# Patient Record
Sex: FEMALE | Race: BLACK OR AFRICAN AMERICAN | NOT HISPANIC OR LATINO | Employment: FULL TIME | URBAN - METROPOLITAN AREA
[De-identification: names, ages, dates, MRNs, and addresses within clinical notes are randomized per-mention and may not be internally consistent; named-entity substitution may affect disease eponyms.]

---

## 2023-08-21 LAB
COBALAMIN (VITAMIN B12) (PG/ML) IN SER/PLAS: 671 PG/ML (ref 211–911)
ERYTHROCYTE DISTRIBUTION WIDTH (RATIO) BY AUTOMATED COUNT: 18.4 % (ref 11.5–14.5)
ERYTHROCYTE MEAN CORPUSCULAR HEMOGLOBIN CONCENTRATION (G/DL) BY AUTOMATED: 28 G/DL (ref 32–36)
ERYTHROCYTE MEAN CORPUSCULAR VOLUME (FL) BY AUTOMATED COUNT: 73 FL (ref 80–100)
ERYTHROCYTES (10*6/UL) IN BLOOD BY AUTOMATED COUNT: 4.67 X10E12/L (ref 4–5.2)
ESTIMATED AVERAGE GLUCOSE FOR HBA1C: 111 MG/DL
FOLATE (NG/ML) IN SER/PLAS: 21.7 NG/ML
HEMATOCRIT (%) IN BLOOD BY AUTOMATED COUNT: 33.9 % (ref 36–46)
HEMOGLOBIN (G/DL) IN BLOOD: 9.5 G/DL (ref 12–16)
HEMOGLOBIN A1C/HEMOGLOBIN TOTAL IN BLOOD: 5.5 %
LEUKOCYTES (10*3/UL) IN BLOOD BY AUTOMATED COUNT: 5.7 X10E9/L (ref 4.4–11.3)
NRBC (PER 100 WBCS) BY AUTOMATED COUNT: 0 /100 WBC (ref 0–0)
PLATELETS (10*3/UL) IN BLOOD AUTOMATED COUNT: 414 X10E9/L (ref 150–450)
THYROTROPIN (MIU/L) IN SER/PLAS BY DETECTION LIMIT <= 0.05 MIU/L: 1.27 MIU/L (ref 0.44–3.98)

## 2023-08-22 LAB
FERRITIN (UG/LL) IN SER/PLAS: 11 UG/L (ref 8–150)
IRON (UG/DL) IN SER/PLAS: 20 UG/DL (ref 35–150)
IRON BINDING CAPACITY (UG/DL) IN SER/PLAS: >470 UG/DL (ref 240–445)
IRON SATURATION (%) IN SER/PLAS: ABNORMAL % (ref 25–45)

## 2023-10-11 ENCOUNTER — APPOINTMENT (OUTPATIENT)
Dept: PRIMARY CARE | Facility: CLINIC | Age: 33
End: 2023-10-11
Payer: COMMERCIAL

## 2023-10-21 PROBLEM — M25.562 BILATERAL KNEE PAIN: Status: ACTIVE | Noted: 2023-10-21

## 2023-10-21 PROBLEM — S09.93XS: Status: ACTIVE | Noted: 2023-10-21

## 2023-10-21 PROBLEM — J06.9 ACUTE URI: Status: ACTIVE | Noted: 2023-10-21

## 2023-10-21 PROBLEM — T39.95XA ANALGESIC REBOUND HEADACHE: Status: ACTIVE | Noted: 2023-10-21

## 2023-10-21 PROBLEM — R10.84 ABDOMINAL CRAMPING, GENERALIZED: Status: ACTIVE | Noted: 2023-10-21

## 2023-10-21 PROBLEM — N39.41 URGE INCONTINENCE: Status: ACTIVE | Noted: 2023-10-21

## 2023-10-21 PROBLEM — M76.61 ACHILLES TENDINITIS OF BOTH LOWER EXTREMITIES: Status: ACTIVE | Noted: 2023-10-21

## 2023-10-21 PROBLEM — M54.50 LOW BACK PAIN: Status: ACTIVE | Noted: 2023-10-21

## 2023-10-21 PROBLEM — E55.9 VITAMIN D DEFICIENCY: Status: ACTIVE | Noted: 2023-10-21

## 2023-10-21 PROBLEM — F41.9 ANXIETY: Status: ACTIVE | Noted: 2023-10-21

## 2023-10-21 PROBLEM — Z86.59 HISTORY OF BIPOLAR DISORDER: Status: ACTIVE | Noted: 2023-10-21

## 2023-10-21 PROBLEM — L72.0 INCLUSION CYST: Status: ACTIVE | Noted: 2023-10-21

## 2023-10-21 PROBLEM — M25.561 BILATERAL KNEE PAIN: Status: ACTIVE | Noted: 2023-10-21

## 2023-10-21 PROBLEM — D64.9 ANEMIA: Status: ACTIVE | Noted: 2023-10-21

## 2023-10-21 PROBLEM — M76.62 ACHILLES TENDINITIS OF BOTH LOWER EXTREMITIES: Status: ACTIVE | Noted: 2023-10-21

## 2023-10-21 PROBLEM — R73.03 PREDIABETES: Status: ACTIVE | Noted: 2023-10-21

## 2023-10-21 PROBLEM — M25.50 JOINT PAIN: Status: ACTIVE | Noted: 2023-10-21

## 2023-10-21 PROBLEM — E78.5 HYPERLIPIDEMIA: Status: ACTIVE | Noted: 2023-10-21

## 2023-10-21 PROBLEM — J06.9 ACUTE URI: Status: RESOLVED | Noted: 2023-10-21 | Resolved: 2023-10-21

## 2023-10-21 PROBLEM — T78.40XA ALLERGIES: Status: ACTIVE | Noted: 2023-10-21

## 2023-10-21 PROBLEM — R00.2 PALPITATIONS: Status: ACTIVE | Noted: 2023-10-21

## 2023-10-21 PROBLEM — N92.6 ABNORMAL SHORT MENSTRUAL CYCLE: Status: ACTIVE | Noted: 2023-10-21

## 2023-10-21 PROBLEM — R68.89 MULTIPLE COMPLAINTS: Status: ACTIVE | Noted: 2023-10-21

## 2023-10-21 PROBLEM — D50.9 IRON DEFICIENCY ANEMIA: Status: ACTIVE | Noted: 2023-10-21

## 2023-10-21 PROBLEM — K58.9 IRRITABLE BOWEL DISEASE: Status: ACTIVE | Noted: 2023-10-21

## 2023-10-21 PROBLEM — E66.9 OBESITY (BMI 35.0-39.9 WITHOUT COMORBIDITY): Status: ACTIVE | Noted: 2023-10-21

## 2023-10-21 PROBLEM — G44.40 ANALGESIC REBOUND HEADACHE: Status: ACTIVE | Noted: 2023-10-21

## 2023-10-21 PROBLEM — D64.9 ANEMIA: Status: RESOLVED | Noted: 2023-10-21 | Resolved: 2023-10-21

## 2023-10-21 PROBLEM — R07.9 CHEST PAIN: Status: ACTIVE | Noted: 2023-10-21

## 2023-10-21 RX ORDER — FERROUS SULFATE 325(65) MG
1 TABLET, DELAYED RELEASE (ENTERIC COATED) ORAL EVERY OTHER DAY
COMMUNITY
Start: 2022-03-30 | End: 2024-03-25 | Stop reason: WASHOUT

## 2023-10-21 RX ORDER — LIDOCAINE 560 MG/1
PATCH PERCUTANEOUS; TOPICAL; TRANSDERMAL
COMMUNITY
Start: 2021-01-05 | End: 2024-01-11

## 2023-10-21 RX ORDER — ALBUTEROL SULFATE 90 UG/1
AEROSOL, METERED RESPIRATORY (INHALATION)
COMMUNITY
Start: 2021-10-17 | End: 2024-01-18 | Stop reason: WASHOUT

## 2023-10-21 RX ORDER — ACETAMINOPHEN 325 MG/1
325 TABLET ORAL 4 TIMES DAILY PRN
COMMUNITY
Start: 2021-09-23

## 2023-10-21 RX ORDER — BUSPIRONE HYDROCHLORIDE 5 MG/1
5 TABLET ORAL EVERY 12 HOURS
COMMUNITY
End: 2024-01-18 | Stop reason: WASHOUT

## 2023-10-21 RX ORDER — ASPIRIN 325 MG
50000 TABLET, DELAYED RELEASE (ENTERIC COATED) ORAL
COMMUNITY
Start: 2021-10-22 | End: 2024-01-18 | Stop reason: WASHOUT

## 2023-10-21 RX ORDER — TIZANIDINE 2 MG/1
2 TABLET ORAL NIGHTLY PRN
COMMUNITY
Start: 2021-10-20 | End: 2024-01-18 | Stop reason: WASHOUT

## 2023-10-21 RX ORDER — ULIPRISTAL ACETATE 30 MG/1
TABLET ORAL
COMMUNITY
End: 2024-01-18 | Stop reason: WASHOUT

## 2023-10-21 RX ORDER — LACTIC ACID, L-, CITRIC ACID MONOHYDRATE, AND POTASSIUM BITARTRATE 90; 50; 20 MG/5G; MG/5G; MG/5G
GEL VAGINAL
COMMUNITY
End: 2024-01-18 | Stop reason: WASHOUT

## 2023-10-21 RX ORDER — HYDROXYZINE PAMOATE 25 MG/1
25 CAPSULE ORAL 3 TIMES DAILY PRN
COMMUNITY
End: 2024-01-18 | Stop reason: WASHOUT

## 2023-10-21 RX ORDER — IBUPROFEN 600 MG/1
600 TABLET ORAL EVERY 6 HOURS PRN
COMMUNITY
Start: 2020-12-30 | End: 2024-01-18 | Stop reason: WASHOUT

## 2023-10-21 RX ORDER — OMEPRAZOLE 20 MG/1
CAPSULE, DELAYED RELEASE ORAL
COMMUNITY
Start: 2021-10-17 | End: 2024-01-18 | Stop reason: WASHOUT

## 2023-10-21 RX ORDER — DOCUSATE SODIUM 100 MG/1
100 CAPSULE, LIQUID FILLED ORAL 2 TIMES DAILY PRN
COMMUNITY
Start: 2020-12-30 | End: 2024-01-18 | Stop reason: SDUPTHER

## 2023-10-21 RX ORDER — CYCLOBENZAPRINE HCL 5 MG
5 TABLET ORAL NIGHTLY PRN
COMMUNITY
Start: 2021-01-05 | End: 2024-03-25 | Stop reason: WASHOUT

## 2023-10-24 ENCOUNTER — OFFICE VISIT (OUTPATIENT)
Dept: PRIMARY CARE | Facility: CLINIC | Age: 33
End: 2023-10-24
Payer: COMMERCIAL

## 2023-10-24 VITALS
WEIGHT: 205 LBS | DIASTOLIC BLOOD PRESSURE: 79 MMHG | HEART RATE: 82 BPM | TEMPERATURE: 98.3 F | OXYGEN SATURATION: 99 % | SYSTOLIC BLOOD PRESSURE: 132 MMHG | HEIGHT: 67 IN | BODY MASS INDEX: 32.18 KG/M2 | RESPIRATION RATE: 16 BRPM

## 2023-10-24 DIAGNOSIS — R82.998 LEUKOCYTES IN URINE: ICD-10-CM

## 2023-10-24 DIAGNOSIS — D64.9 ANEMIA, UNSPECIFIED TYPE: Primary | ICD-10-CM

## 2023-10-24 DIAGNOSIS — M54.12 CERVICAL RADICULITIS: ICD-10-CM

## 2023-10-24 PROCEDURE — 99214 OFFICE O/P EST MOD 30 MIN: CPT | Performed by: NURSE PRACTITIONER

## 2023-10-24 PROCEDURE — 99204 OFFICE O/P NEW MOD 45 MIN: CPT | Performed by: NURSE PRACTITIONER

## 2023-10-24 RX ORDER — CYCLOBENZAPRINE HCL 5 MG
5 TABLET ORAL NIGHTLY PRN
Qty: 30 TABLET | Refills: 0 | Status: SHIPPED | OUTPATIENT
Start: 2023-10-24 | End: 2023-12-23

## 2023-10-24 RX ORDER — NAPROXEN 500 MG/1
500 TABLET ORAL 2 TIMES DAILY PRN
Qty: 60 TABLET | Refills: 0 | Status: SHIPPED | OUTPATIENT
Start: 2023-10-24 | End: 2024-01-18 | Stop reason: WASHOUT

## 2023-10-24 RX ORDER — DICLOFENAC SODIUM 10 MG/G
4 GEL TOPICAL 4 TIMES DAILY
Qty: 100 G | Refills: 1 | Status: SHIPPED | OUTPATIENT
Start: 2023-10-24 | End: 2024-01-18 | Stop reason: WASHOUT

## 2023-10-24 SDOH — ECONOMIC STABILITY: INCOME INSECURITY: IN THE LAST 12 MONTHS, WAS THERE A TIME WHEN YOU WERE NOT ABLE TO PAY THE MORTGAGE OR RENT ON TIME?: NO

## 2023-10-24 SDOH — ECONOMIC STABILITY: HOUSING INSECURITY
IN THE LAST 12 MONTHS, WAS THERE A TIME WHEN YOU DID NOT HAVE A STEADY PLACE TO SLEEP OR SLEPT IN A SHELTER (INCLUDING NOW)?: NO

## 2023-10-24 SDOH — ECONOMIC STABILITY: FOOD INSECURITY: WITHIN THE PAST 12 MONTHS, YOU WORRIED THAT YOUR FOOD WOULD RUN OUT BEFORE YOU GOT MONEY TO BUY MORE.: NEVER TRUE

## 2023-10-24 SDOH — ECONOMIC STABILITY: FOOD INSECURITY: WITHIN THE PAST 12 MONTHS, THE FOOD YOU BOUGHT JUST DIDN'T LAST AND YOU DIDN'T HAVE MONEY TO GET MORE.: NEVER TRUE

## 2023-10-24 SDOH — ECONOMIC STABILITY: GENERAL
WHICH OF THE FOLLOWING DO YOU KNOW HOW TO USE AND HAVE ACCESS TO EVERY DAY? (CHOOSE ALL THAT APPLY): SMARTPHONE WITH CELLULAR DATA PLAN;DESKTOP COMPUTER, LAPTOP COMPUTER, OR TABLET WITH BROADBAND INTERNET CONNECTION

## 2023-10-24 ASSESSMENT — COLUMBIA-SUICIDE SEVERITY RATING SCALE - C-SSRS
2. HAVE YOU ACTUALLY HAD ANY THOUGHTS OF KILLING YOURSELF?: NO
1. IN THE PAST MONTH, HAVE YOU WISHED YOU WERE DEAD OR WISHED YOU COULD GO TO SLEEP AND NOT WAKE UP?: NO
6. HAVE YOU EVER DONE ANYTHING, STARTED TO DO ANYTHING, OR PREPARED TO DO ANYTHING TO END YOUR LIFE?: NO

## 2023-10-24 ASSESSMENT — PATIENT HEALTH QUESTIONNAIRE - PHQ9
1. LITTLE INTEREST OR PLEASURE IN DOING THINGS: NOT AT ALL
2. FEELING DOWN, DEPRESSED OR HOPELESS: NOT AT ALL
SUM OF ALL RESPONSES TO PHQ9 QUESTIONS 1 AND 2: 0
1. LITTLE INTEREST OR PLEASURE IN DOING THINGS: NOT AT ALL
SUM OF ALL RESPONSES TO PHQ9 QUESTIONS 1 AND 2: 0
SUM OF ALL RESPONSES TO PHQ9 QUESTIONS 1 & 2: 0
1. LITTLE INTEREST OR PLEASURE IN DOING THINGS: NOT AT ALL
2. FEELING DOWN, DEPRESSED OR HOPELESS: NOT AT ALL
2. FEELING DOWN, DEPRESSED OR HOPELESS: NOT AT ALL

## 2023-10-24 ASSESSMENT — SOCIAL DETERMINANTS OF HEALTH (SDOH)
WITHIN THE LAST YEAR, HAVE YOU BEEN KICKED, HIT, SLAPPED, OR OTHERWISE PHYSICALLY HURT BY YOUR PARTNER OR EX-PARTNER?: NO
WITHIN THE LAST YEAR, HAVE YOU BEEN AFRAID OF YOUR PARTNER OR EX-PARTNER?: NO
WITHIN THE LAST YEAR, HAVE YOU BEEN HUMILIATED OR EMOTIONALLY ABUSED IN OTHER WAYS BY YOUR PARTNER OR EX-PARTNER?: NO
WITHIN THE LAST YEAR, HAVE TO BEEN RAPED OR FORCED TO HAVE ANY KIND OF SEXUAL ACTIVITY BY YOUR PARTNER OR EX-PARTNER?: NO
IN THE PAST 12 MONTHS, HAS THE ELECTRIC, GAS, OIL, OR WATER COMPANY THREATENED TO SHUT OFF SERVICE IN YOUR HOME?: NO

## 2023-10-24 ASSESSMENT — ENCOUNTER SYMPTOMS
OCCASIONAL FEELINGS OF UNSTEADINESS: 0
LOSS OF SENSATION IN FEET: 0
DEPRESSION: 0
ABDOMINAL PAIN: 1

## 2023-10-24 ASSESSMENT — LIFESTYLE VARIABLES
AUDIT-C TOTAL SCORE: 0
HOW MANY STANDARD DRINKS CONTAINING ALCOHOL DO YOU HAVE ON A TYPICAL DAY: PATIENT DOES NOT DRINK
HOW OFTEN DO YOU HAVE SIX OR MORE DRINKS ON ONE OCCASION: NEVER
SKIP TO QUESTIONS 9-10: 1
HOW OFTEN DO YOU HAVE A DRINK CONTAINING ALCOHOL: NEVER

## 2023-10-24 ASSESSMENT — PAIN SCALES - GENERAL: PAINLEVEL: 0-NO PAIN

## 2023-10-24 NOTE — PATIENT INSTRUCTIONS
Thank you for coming in for your visit today!    Please follow up in 6 weeks to evaluate your neck pain.    Call to schedule with physical therapy and chiropractic medicine  Consider a cervical pillow and/or acupressure mat  Take Naprosyn every 12 hours around the clock to decrease inflammation.  Take the muscle relaxer to decrease spasms. This medication may cause drowsiness. Please do not operate machinery while taking this medication.    STRETCH!   Use heat and apply muscle rub prior to stretching.   Apply ice after stretching and while resting to decrease inflammation.   Soak in 2-3 cups of Epsom salt in the bathtub.      Call 911 or go to the emergency room if you have pain in your chest, difficulty breathing, or other life threatening symptoms.

## 2023-10-24 NOTE — PROGRESS NOTES
"Subjective   Janna Lee is a 33 y.o. female who presents for Abdominal Pain.  Abdominal Pain        Ms. Lee is a 34 yo F here today for NPV and ED follow up   PMH: heart murmur, anxiety medication, anemia     Anxiety meds prescribed through  OB  Abnormal pap smear but normal for the last 3 years    Tingling in her L arm that has been ongoing for the last year. This is associated with headaches, blurred vision, chest pains on the L side and feeling dizzy. She reports the feeling of a needle poking her skin for about 30 mins to 1 hour until it resolves. She will put her arm over her head and practice slow breathing while the episodes occur. She was prescribed naproxen and muscle relaxers at the ED on 10/17. Reports that the muscle relaxers make her tired and she will wake without the pain.     Ongoing back pain since she had her 2 year old daughter. Completed PT thereafter.     She thought initially that this was related to anxiety but she feels that this is a different sensation.   Medications: 2 anxiety medications that she takes as needed.     Allergies: lidocaine    Smoking: Denies  ETOH: Denies  Illicits: denies     Family history: DM, CHF, lupus   Father with stomach ulcers      All systems reviewed. Review of systems negative except for noted positives in HPI    Objective     /79 (BP Location: Left arm, Patient Position: Sitting, BP Cuff Size: Large adult)   Pulse 82   Temp 36.8 °C (98.3 °F) (Temporal)   Resp 16   Ht 1.702 m (5' 7\")   Wt 93 kg (205 lb)   LMP 10/20/2023 (Exact Date)   SpO2 99%   BMI 32.11 kg/m²    Vital signs noted and reviewed.       Physical Exam  Constitutional:       Appearance: Normal appearance.   Cardiovascular:      Rate and Rhythm: Normal rate and regular rhythm.   Pulmonary:      Effort: Pulmonary effort is normal. No respiratory distress.      Breath sounds: Normal breath sounds.   Musculoskeletal:      Cervical back: Swelling and tenderness present.   Skin:     " General: Skin is warm and dry.   Neurological:      Mental Status: She is oriented to person, place, and time.   Psychiatric:         Mood and Affect: Mood normal.             Assessment/Plan   Problem List Items Addressed This Visit    None  Visit Diagnoses       Anemia, unspecified type    -  Primary    Leukocytes in urine        Cervical radiculitis        Relevant Medications    cyclobenzaprine (Flexeril) 5 mg tablet    naproxen (Naprosyn) 500 mg tablet    diclofenac sodium (Voltaren) 1 % gel gel    Other Relevant Orders    Referral to Physical Therapy    Referral to [facility]

## 2024-01-11 ENCOUNTER — OFFICE VISIT (OUTPATIENT)
Dept: PRIMARY CARE | Facility: CLINIC | Age: 34
End: 2024-01-11
Payer: COMMERCIAL

## 2024-01-11 VITALS
WEIGHT: 213 LBS | SYSTOLIC BLOOD PRESSURE: 127 MMHG | BODY MASS INDEX: 33.43 KG/M2 | DIASTOLIC BLOOD PRESSURE: 76 MMHG | OXYGEN SATURATION: 100 % | HEART RATE: 75 BPM | RESPIRATION RATE: 16 BRPM | TEMPERATURE: 97.3 F | HEIGHT: 67 IN

## 2024-01-11 DIAGNOSIS — M54.2 NECK PAIN: ICD-10-CM

## 2024-01-11 DIAGNOSIS — O03.9 MISCARRIAGE (HHS-HCC): Primary | ICD-10-CM

## 2024-01-11 DIAGNOSIS — L02.91 ABSCESS: ICD-10-CM

## 2024-01-11 PROCEDURE — 99214 OFFICE O/P EST MOD 30 MIN: CPT | Performed by: NURSE PRACTITIONER

## 2024-01-11 PROCEDURE — 1036F TOBACCO NON-USER: CPT | Performed by: NURSE PRACTITIONER

## 2024-01-11 RX ORDER — BENZOCAINE AND MENTHOL 15; 2.6 MG/1; MG/1
1 LOZENGE ORAL
COMMUNITY
Start: 2021-07-10 | End: 2024-01-18 | Stop reason: WASHOUT

## 2024-01-11 RX ORDER — CEPHALEXIN 500 MG/1
500 CAPSULE ORAL 4 TIMES DAILY
COMMUNITY
Start: 2023-12-30 | End: 2024-01-18 | Stop reason: WASHOUT

## 2024-01-11 RX ORDER — FLUTICASONE PROPIONATE 50 MCG
1 SPRAY, SUSPENSION (ML) NASAL
COMMUNITY
Start: 2021-12-30 | End: 2024-01-18 | Stop reason: WASHOUT

## 2024-01-11 RX ORDER — CYCLOBENZAPRINE HCL 10 MG
10 TABLET ORAL NIGHTLY PRN
COMMUNITY
End: 2024-03-25 | Stop reason: WASHOUT

## 2024-01-11 RX ORDER — HYDROXYZINE HYDROCHLORIDE 25 MG/1
TABLET, FILM COATED ORAL
COMMUNITY
Start: 2023-08-21 | End: 2024-01-18 | Stop reason: WASHOUT

## 2024-01-11 ASSESSMENT — PAIN SCALES - GENERAL: PAINLEVEL: 3

## 2024-01-11 NOTE — PATIENT INSTRUCTIONS
Thank you for coming in for your visit today!    Please follow up in 8-12 weeks if preferred.     Focus on the basics!   Continue to listen to your body.  Restart iron supplementation.     Call to schedule with acupuncture     Keep upcoming OB appointments.    Refills will be sent through your pharmacy.    Call to schedule with general surgery for consultation of axillary abscess.    Call 911 or go to the emergency room if you have pain in your chest, difficulty breathing, or other life threatening symptoms.

## 2024-01-11 NOTE — PROGRESS NOTES
"Bonnie Lee is a 33 y.o. female who presents for No chief complaint on file..  HPI  Patient is here today for follow up   Recent miscarriage seen on 12/29 at 5 weeks gestation and was seen again in the emergency room on 1/2 with similar concern. Following with OB.     Today has concern for ongoing back, neck, and chest pain. Has been seeing the chiropractor and acupunturist for these concerns and found relief when she was having these treatments consistently.  Had been utilizing medications as listed. Has not followed up with physical therapy. Feels like she felt an increase in these symptoms immediately following the miscarriage. She feels as if her body is in a state of imbalance.     General surgery   Has had cyst lanced on abdomen once. Also notes history of frequent abscesses in the bilateral axilla. She would like to have a consultation to discuss removal today.      Chest xray normal from 12/29   Ddimer normal.     Currently on a detox, salads, smoothies. Trying to focus on her health.       All systems reviewed. Review of systems negative except for noted positives in HPI    Objective     /76   Pulse 75   Temp 36.3 °C (97.3 °F)   Resp 16   Ht 1.702 m (5' 7\")   Wt 96.6 kg (213 lb)   SpO2 100%   BMI 33.36 kg/m²    Vital signs noted and reviewed.       Physical Exam  Constitutional:       Appearance: Normal appearance.   Cardiovascular:      Rate and Rhythm: Normal rate and regular rhythm.   Pulmonary:      Effort: Pulmonary effort is normal. No respiratory distress.      Breath sounds: Normal breath sounds.   Skin:     General: Skin is warm and dry.   Neurological:      Mental Status: She is oriented to person, place, and time.   Psychiatric:         Mood and Affect: Mood normal.             Assessment/Plan   Problem List Items Addressed This Visit    None  Visit Diagnoses       Miscarriage    -  Primary    Relevant Orders    Referral to [facility]    Abscess        " Relevant Orders    Referral to General Surgery    Neck pain

## 2024-01-17 PROBLEM — R68.89 MULTIPLE COMPLAINTS: Status: RESOLVED | Noted: 2023-10-21 | Resolved: 2024-01-17

## 2024-01-17 PROBLEM — T78.40XA ALLERGIES: Status: RESOLVED | Noted: 2023-10-21 | Resolved: 2024-01-17

## 2024-01-17 NOTE — PROGRESS NOTES
Janna Lee is a 33 y.o. here for follow up s/p SAB at 5w. Patient was seen in ED on  and had HCG of 276, on  she represented to ED and had HCG of 70.     Patient denies any bleeding/pelvic pain currently.     GynHx:  Patient's last menstrual period was 2023.     Last pap: 23 WNL HPV neg  20 WNL HPV neg  3/18/19 ASCUS neg  4/29/15 LSIL  14 LSIL  13 LSIL  STI testing today: No     OB History          5    Para   4    Term   4            AB   1    Living   4         SAB   1    IAB        Ectopic        Multiple        Live Births   4                  Past Medical History:   Diagnosis Date    Hidradenitis suppurativa 2021    Hydradenitis    Hx of chlamydia infection     Hx of gonorrhea     Low grade squamous intraepithelial lesion on cytologic smear of cervix (LGSIL) 2019    LGSIL on Pap smear of cervix    Migraine     Personal history of other mental and behavioral disorders 2021    History of bipolar disorder       Past Surgical History:   Procedure Laterality Date    COLPOSCOPY  2014    Colposcopy       Objective   /85   Pulse 91   Wt 98.6 kg (217 lb 6.4 oz)   LMP 2023   BMI 34.05 kg/m²     Physical Exam  Constitutional:       Appearance: Normal appearance.   Pulmonary:      Effort: Pulmonary effort is normal.   Neurological:      Mental Status: She is alert.   Psychiatric:         Mood and Affect: Mood normal.         Behavior: Behavior normal.         Thought Content: Thought content normal.         Judgment: Judgment normal.         Problem List Items Addressed This Visit    None  Visit Diagnoses       SAB (spontaneous )    -  Primary    Relevant Medications    docusate sodium (Colace) 100 mg capsule    prenatal vitamin, iron-folic, 27 mg iron-800 mcg folic acid tablet    cholecalciferol (Vitamin D-3) 50,000 unit capsule    Other Relevant Orders    Human Chorionic Gonadotropin, Serum Quantitative                SWATI Marquez

## 2024-01-18 ENCOUNTER — OFFICE VISIT (OUTPATIENT)
Dept: OBSTETRICS AND GYNECOLOGY | Facility: CLINIC | Age: 34
End: 2024-01-18
Payer: COMMERCIAL

## 2024-01-18 ENCOUNTER — LAB (OUTPATIENT)
Dept: LAB | Facility: LAB | Age: 34
End: 2024-01-18
Payer: COMMERCIAL

## 2024-01-18 VITALS
BODY MASS INDEX: 34.05 KG/M2 | DIASTOLIC BLOOD PRESSURE: 85 MMHG | SYSTOLIC BLOOD PRESSURE: 120 MMHG | HEART RATE: 91 BPM | WEIGHT: 217.4 LBS

## 2024-01-18 DIAGNOSIS — O03.9 SAB (SPONTANEOUS ABORTION) (HHS-HCC): Primary | ICD-10-CM

## 2024-01-18 DIAGNOSIS — O03.9 SAB (SPONTANEOUS ABORTION) (HHS-HCC): ICD-10-CM

## 2024-01-18 PROBLEM — M25.562 BILATERAL KNEE PAIN: Status: RESOLVED | Noted: 2023-10-21 | Resolved: 2024-01-18

## 2024-01-18 PROBLEM — M54.50 LOW BACK PAIN: Status: RESOLVED | Noted: 2023-10-21 | Resolved: 2024-01-18

## 2024-01-18 PROBLEM — R87.619 ABNORMAL PAP SMEAR OF CERVIX: Status: ACTIVE | Noted: 2024-01-18

## 2024-01-18 PROBLEM — R10.84 ABDOMINAL CRAMPING, GENERALIZED: Status: RESOLVED | Noted: 2023-10-21 | Resolved: 2024-01-18

## 2024-01-18 PROBLEM — M25.50 JOINT PAIN: Status: RESOLVED | Noted: 2023-10-21 | Resolved: 2024-01-18

## 2024-01-18 PROBLEM — N92.6 ABNORMAL SHORT MENSTRUAL CYCLE: Status: RESOLVED | Noted: 2023-10-21 | Resolved: 2024-01-18

## 2024-01-18 PROBLEM — M25.561 BILATERAL KNEE PAIN: Status: RESOLVED | Noted: 2023-10-21 | Resolved: 2024-01-18

## 2024-01-18 PROBLEM — D50.9 IRON DEFICIENCY ANEMIA: Status: RESOLVED | Noted: 2023-10-21 | Resolved: 2024-01-18

## 2024-01-18 PROCEDURE — 99213 OFFICE O/P EST LOW 20 MIN: CPT | Performed by: ADVANCED PRACTICE MIDWIFE

## 2024-01-18 PROCEDURE — 36415 COLL VENOUS BLD VENIPUNCTURE: CPT

## 2024-01-18 PROCEDURE — 1036F TOBACCO NON-USER: CPT | Performed by: ADVANCED PRACTICE MIDWIFE

## 2024-01-18 PROCEDURE — 84702 CHORIONIC GONADOTROPIN TEST: CPT

## 2024-01-18 RX ORDER — ASPIRIN 325 MG
50000 TABLET, DELAYED RELEASE (ENTERIC COATED) ORAL
Qty: 30 CAPSULE | Refills: 0 | Status: SHIPPED | OUTPATIENT
Start: 2024-01-18 | End: 2024-03-25 | Stop reason: WASHOUT

## 2024-01-18 RX ORDER — DOCUSATE SODIUM 100 MG/1
100 CAPSULE, LIQUID FILLED ORAL NIGHTLY PRN
Qty: 60 CAPSULE | Refills: 1 | Status: SHIPPED | OUTPATIENT
Start: 2024-01-18 | End: 2024-03-25 | Stop reason: WASHOUT

## 2024-01-18 ASSESSMENT — PAIN SCALES - GENERAL: PAINLEVEL: 0-NO PAIN

## 2024-01-18 ASSESSMENT — ENCOUNTER SYMPTOMS
CARDIOVASCULAR NEGATIVE: 0
MUSCULOSKELETAL NEGATIVE: 0
PSYCHIATRIC NEGATIVE: 0
CONSTITUTIONAL NEGATIVE: 0
GASTROINTESTINAL NEGATIVE: 0
ENDOCRINE NEGATIVE: 0
HEMATOLOGIC/LYMPHATIC NEGATIVE: 0
ALLERGIC/IMMUNOLOGIC NEGATIVE: 0
NEUROLOGICAL NEGATIVE: 0
EYES NEGATIVE: 0
RESPIRATORY NEGATIVE: 0

## 2024-01-19 LAB — B-HCG SERPL-ACNC: <3 MIU/ML

## 2024-03-13 ENCOUNTER — APPOINTMENT (OUTPATIENT)
Dept: PRIMARY CARE | Facility: CLINIC | Age: 34
End: 2024-03-13
Payer: COMMERCIAL

## 2024-03-19 ENCOUNTER — APPOINTMENT (OUTPATIENT)
Dept: PRIMARY CARE | Facility: CLINIC | Age: 34
End: 2024-03-19
Payer: COMMERCIAL

## 2024-03-21 PROBLEM — S62.109A FRACTURE OF CARPAL BONE: Status: ACTIVE | Noted: 2024-03-21

## 2024-03-21 PROBLEM — E66.9 OBESITY WITH BODY MASS INDEX 30 OR GREATER: Status: ACTIVE | Noted: 2023-10-21

## 2024-03-21 PROBLEM — Z59.41 FOOD INSECURITY: Status: ACTIVE | Noted: 2024-03-21

## 2024-03-21 PROBLEM — N39.41 URGE INCONTINENCE OF URINE: Status: ACTIVE | Noted: 2023-08-21

## 2024-03-21 PROBLEM — Z86.69 HISTORY OF MIGRAINE: Status: ACTIVE | Noted: 2023-12-29

## 2024-03-21 PROBLEM — N93.9 ABNORMAL UTERINE BLEEDING: Status: ACTIVE | Noted: 2023-08-23

## 2024-03-21 PROBLEM — K58.9 IRRITABLE BOWEL SYNDROME: Status: ACTIVE | Noted: 2023-10-21

## 2024-03-21 PROBLEM — N94.6 DYSMENORRHEA: Status: ACTIVE | Noted: 2023-10-21

## 2024-03-21 PROBLEM — N39.0 URINARY TRACT INFECTION: Status: ACTIVE | Noted: 2024-03-21

## 2024-03-21 PROBLEM — Z86.19 HISTORY OF SEXUALLY TRANSMITTED DISEASE: Status: ACTIVE | Noted: 2023-12-29

## 2024-03-21 PROBLEM — A59.9 TRICHOMONIASIS: Status: ACTIVE | Noted: 2024-03-21
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
## 2024-03-21 PROBLEM — E66.9 CLASS 1 OBESITY: Status: ACTIVE | Noted: 2024-03-21

## 2024-03-21 PROBLEM — M43.6 TORTICOLLIS: Status: ACTIVE | Noted: 2023-10-21

## 2024-03-21 PROBLEM — M25.569 KNEE PAIN: Status: ACTIVE | Noted: 2023-10-21

## 2024-03-21 PROBLEM — E66.811 CLASS 1 OBESITY: Status: ACTIVE | Noted: 2024-03-21

## 2024-03-21 PROBLEM — U07.1 DISEASE DUE TO SEVERE ACUTE RESPIRATORY SYNDROME CORONAVIRUS 2 (SARS-COV-2): Status: ACTIVE | Noted: 2024-03-21

## 2024-03-21 PROBLEM — O36.80X0 PREGNANCY OF UNKNOWN ANATOMIC LOCATION (HHS-HCC): Status: ACTIVE | Noted: 2023-12-31

## 2024-03-21 PROBLEM — K62.5 RECTAL HEMORRHAGE: Status: ACTIVE | Noted: 2024-03-21

## 2024-03-21 PROBLEM — R68.89 MULTIPLE COMPLAINTS: Status: ACTIVE | Noted: 2023-10-21

## 2024-03-21 PROBLEM — M25.50 ARTHRALGIA: Status: ACTIVE | Noted: 2023-10-21

## 2024-03-21 PROBLEM — T83.32XA INTRAUTERINE CONTRACEPTIVE DEVICE THREADS LOST: Status: ACTIVE | Noted: 2024-03-21

## 2024-03-21 PROBLEM — D50.9 IRON DEFICIENCY ANEMIA: Status: ACTIVE | Noted: 2023-10-21

## 2024-03-21 PROBLEM — M76.60 ACHILLES TENDINITIS: Status: ACTIVE | Noted: 2024-03-21

## 2024-03-21 PROBLEM — R10.84 GENERALIZED ABDOMINAL PAIN: Status: ACTIVE | Noted: 2023-08-21

## 2024-03-21 PROBLEM — A74.9 INFECTION DUE TO CHLAMYDIA SPECIES: Status: ACTIVE | Noted: 2024-03-21

## 2024-03-21 PROBLEM — H53.8 BLURRING OF VISUAL IMAGE: Status: ACTIVE | Noted: 2024-03-21

## 2024-03-21 PROBLEM — F32.A DEPRESSIVE DISORDER: Status: ACTIVE | Noted: 2024-03-21

## 2024-03-21 PROBLEM — K02.9 DENTAL CARIES: Status: ACTIVE | Noted: 2018-03-07

## 2024-03-21 PROBLEM — R05.9 COUGH: Status: ACTIVE | Noted: 2024-03-21

## 2024-03-21 PROBLEM — L72.0 EPIDERMOID CYST: Status: ACTIVE | Noted: 2023-10-21

## 2024-03-21 PROBLEM — S09.93XA INJURY OF LIP: Status: ACTIVE | Noted: 2023-10-21

## 2024-03-21 PROBLEM — R07.89 SENSATION OF CHEST TIGHTNESS: Status: ACTIVE | Noted: 2023-08-23

## 2024-03-21 RX ORDER — OXYCODONE AND ACETAMINOPHEN 5; 325 MG/1; MG/1
TABLET ORAL EVERY 6 HOURS
COMMUNITY
Start: 2017-05-17 | End: 2024-03-25 | Stop reason: WASHOUT

## 2024-03-21 RX ORDER — NORGESTIMATE AND ETHINYL ESTRADIOL 0.25-0.035
KIT ORAL
COMMUNITY
Start: 2021-04-19 | End: 2024-03-25 | Stop reason: WASHOUT

## 2024-03-21 RX ORDER — PYRIDOXINE HCL (VITAMIN B6) 25 MG
TABLET ORAL
COMMUNITY
Start: 2020-05-19 | End: 2024-03-25 | Stop reason: WASHOUT

## 2024-03-21 RX ORDER — DEXAMETHASONE 2 MG/1
TABLET ORAL
COMMUNITY
Start: 2021-09-28 | End: 2024-03-25 | Stop reason: WASHOUT

## 2024-03-21 RX ORDER — CALCIUM CARBONATE/VITAMIN D3 500 MG-2.5
TABLET,CHEWABLE ORAL
COMMUNITY
Start: 2020-08-13 | End: 2024-03-25 | Stop reason: WASHOUT

## 2024-03-21 RX ORDER — PSYLLIUM HUSK 3.4 G/5.8G
POWDER ORAL
COMMUNITY
Start: 2021-05-17 | End: 2024-03-25 | Stop reason: WASHOUT

## 2024-03-21 RX ORDER — CLINDAMYCIN PHOSPHATE 1 G/10ML
GEL TOPICAL
COMMUNITY
Start: 2019-10-14 | End: 2024-03-25 | Stop reason: WASHOUT

## 2024-03-21 RX ORDER — DICYCLOMINE HYDROCHLORIDE 10 MG/1
CAPSULE ORAL
COMMUNITY
Start: 2022-11-08 | End: 2024-03-25 | Stop reason: WASHOUT

## 2024-03-21 RX ORDER — DIPHENHYDRAMINE HCL 25 MG
CAPSULE ORAL
COMMUNITY
End: 2024-03-25 | Stop reason: WASHOUT

## 2024-03-21 RX ORDER — AZITHROMYCIN 250 MG/1
TABLET, FILM COATED ORAL
COMMUNITY
Start: 2021-10-20 | End: 2024-03-25 | Stop reason: WASHOUT

## 2024-03-21 RX ORDER — NORELGESTROMIN AND ETHINYL ESTRADIOL 35; 150 UG/MG; UG/MG
PATCH TRANSDERMAL
COMMUNITY
Start: 2019-04-24 | End: 2024-03-25 | Stop reason: WASHOUT

## 2024-03-21 RX ORDER — MULTIVITAMIN
TABLET ORAL
COMMUNITY
End: 2024-03-25 | Stop reason: WASHOUT

## 2024-03-21 RX ORDER — ELECTROLYTES/DEXTROSE
14.2 SOLUTION, ORAL ORAL
COMMUNITY
Start: 2019-10-14 | End: 2024-03-25 | Stop reason: WASHOUT

## 2024-03-21 RX ORDER — AZELASTINE HYDROCHLORIDE 0.5 MG/ML
SOLUTION/ DROPS OPHTHALMIC EVERY 12 HOURS
COMMUNITY
Start: 2021-10-15 | End: 2024-03-25 | Stop reason: WASHOUT

## 2024-03-21 RX ORDER — PETROLATUM,WHITE 41 %
OINTMENT (GRAM) TOPICAL
COMMUNITY
Start: 2023-08-21 | End: 2024-03-25 | Stop reason: WASHOUT

## 2024-03-21 RX ORDER — SIMETHICONE 125 MG
CAPSULE ORAL
COMMUNITY
Start: 2021-05-17 | End: 2024-03-25 | Stop reason: WASHOUT

## 2024-03-25 ENCOUNTER — OFFICE VISIT (OUTPATIENT)
Dept: PRIMARY CARE | Facility: CLINIC | Age: 34
End: 2024-03-25
Payer: COMMERCIAL

## 2024-03-25 VITALS
OXYGEN SATURATION: 100 % | RESPIRATION RATE: 16 BRPM | TEMPERATURE: 97.1 F | HEART RATE: 75 BPM | SYSTOLIC BLOOD PRESSURE: 141 MMHG | HEIGHT: 67 IN | WEIGHT: 212 LBS | DIASTOLIC BLOOD PRESSURE: 92 MMHG | BODY MASS INDEX: 33.27 KG/M2

## 2024-03-25 DIAGNOSIS — H65.192 ACUTE EFFUSION OF LEFT EAR: ICD-10-CM

## 2024-03-25 DIAGNOSIS — M25.59 PAIN IN OTHER JOINT: ICD-10-CM

## 2024-03-25 DIAGNOSIS — N94.10 DYSPAREUNIA IN FEMALE: Primary | ICD-10-CM

## 2024-03-25 DIAGNOSIS — N39.41 URGE INCONTINENCE OF URINE: ICD-10-CM

## 2024-03-25 PROCEDURE — 1036F TOBACCO NON-USER: CPT | Performed by: NURSE PRACTITIONER

## 2024-03-25 PROCEDURE — 99215 OFFICE O/P EST HI 40 MIN: CPT | Performed by: NURSE PRACTITIONER

## 2024-03-25 RX ORDER — FLUTICASONE PROPIONATE 50 MCG
1 SPRAY, SUSPENSION (ML) NASAL DAILY
Qty: 16 G | Refills: 11 | Status: SHIPPED | OUTPATIENT
Start: 2024-03-25 | End: 2025-03-25

## 2024-03-25 RX ORDER — CETIRIZINE HYDROCHLORIDE 10 MG/1
10 TABLET ORAL DAILY
Qty: 90 TABLET | Refills: 2 | Status: SHIPPED | OUTPATIENT
Start: 2024-03-25

## 2024-03-25 ASSESSMENT — PATIENT HEALTH QUESTIONNAIRE - PHQ9
1. LITTLE INTEREST OR PLEASURE IN DOING THINGS: NOT AT ALL
SUM OF ALL RESPONSES TO PHQ9 QUESTIONS 1 AND 2: 0
2. FEELING DOWN, DEPRESSED OR HOPELESS: NOT AT ALL

## 2024-03-25 ASSESSMENT — ENCOUNTER SYMPTOMS
OCCASIONAL FEELINGS OF UNSTEADINESS: 0
LOSS OF SENSATION IN FEET: 0
DEPRESSION: 0

## 2024-03-25 ASSESSMENT — PAIN SCALES - GENERAL: PAINLEVEL: 0-NO PAIN

## 2024-03-25 NOTE — PATIENT INSTRUCTIONS
Thank you for coming in for your visit today!    Please follow up in 6 months or sooner if needed.    Use fluticasone nasal spray two sprays, twice daily.   When using this, position your head down, looking at your toes.   Aim the nozzle to the outside corner of the eye on the same side.  Take cetirizine nightly.     Call to schedule with Josse Moss  Call to schedule with pelvic floor therapy (physical therapy).    Call 911 or go to the emergency room if you have pain in your chest, difficulty breathing, or other life threatening symptoms.

## 2024-03-25 NOTE — PROGRESS NOTES
Subjective   Janna Lee is a 33 y.o. female who presents for Ear Drainage, Abdominal Cramping, and Migraine.  HPI  Ms. Lee is here today with concern for pain and ear ringing.     Was seen at [facility] ED on 3/10 and diagnosed with headache/ CP/ palpitations  She has history of chronic anemia.  Labs/ additional information from this encounter not available but patient notes that she was diagnosed with the flu.  Reviewed labs from ED visit on 2/16 with patient.     To note, patient experienced miscarriage at the end of December. At our last visit in January we discussed resumption of alternative therapies for pain relief, which were previously successful, in addition to physical therapy. She would like to get reconnected. Had acupuncture completed elsewhere and does not wish to continue.   Was lost to follow to follow up with OB until 1/18, at which time negative HCG is noted. Additional beta quant tested on 2/16 which was normal.   Currently menstruating, began yesterday. Notes anticipated symptoms including cramping and mild discomfort. This is her second cycle since the miscarriage.     Today reports that she had been experiencing headaches, on the L side. Notes some recent ear crackling, felt like it was draining.   Feels like it sounds like something is inside her ear, keeps popping.   She notes that she felt like her ear was emptying out water.   Notes some changes to her hearing.  After her ear pain started, she did end up coming down with the flu about 1 month ago.  She also had pressure on one side of her head with associated dizziness around the diagnosis of the flu.   She also had left sided blurriness and L ear ringing.   These symptoms have improved since resolution of the flu.  She continues to feel some pressure in the ear, with more frequent popping.   Denies taking medication for the pressure or discomfort. Denies fever or chills.     Recent pain with sex, which started after the miscarriage.  "  Notes pain both during and after intercourse.   Notes pelvic cramping.   Most recent PAP from 2023 WNL.  She notes that for the last several years she feels that sex is not as pleasurable as it once was. Also notes urge incontinence, now needing to wear a pad at nighttime for leaks.  Open to pelvic floor therapy and further OB consultation.       All systems reviewed. Review of systems negative except for noted positives in HPI    Objective     BP (!) 141/92   Pulse 75   Temp 36.2 °C (97.1 °F)   Resp 16   Ht 1.702 m (5' 7\")   Wt 96.2 kg (212 lb)   LMP 03/24/2024   SpO2 100%   BMI 33.20 kg/m²    Vital signs noted and reviewed.       Physical Exam  Constitutional:       Appearance: Normal appearance.   HENT:      Right Ear: A middle ear effusion is present.   Cardiovascular:      Rate and Rhythm: Normal rate and regular rhythm.   Pulmonary:      Effort: Pulmonary effort is normal. No respiratory distress.      Breath sounds: Normal breath sounds.   Skin:     General: Skin is warm and dry.   Neurological:      Mental Status: She is oriented to person, place, and time.   Psychiatric:         Mood and Affect: Mood normal.             Assessment/Plan   Problem List Items Addressed This Visit       Arthralgia    Relevant Orders    [facility]    [facility]    Urge incontinence of urine    Relevant Orders    Referral to Physical Therapy     Other Visit Diagnoses       Dyspareunia in female    -  Primary    Relevant Orders    Referral to Physical Therapy    Referral to Obstetrics / Gynecology    [facility]    [facility]    Acute effusion of left ear        Relevant Medications    fluticasone (Flonase) 50 mcg/actuation nasal spray    cetirizine (ZyrTEC) 10 mg tablet                    "

## 2024-07-05 ENCOUNTER — OFFICE VISIT (OUTPATIENT)
Dept: PRIMARY CARE | Facility: CLINIC | Age: 34
End: 2024-07-05
Payer: COMMERCIAL

## 2024-07-05 VITALS
HEART RATE: 88 BPM | DIASTOLIC BLOOD PRESSURE: 84 MMHG | SYSTOLIC BLOOD PRESSURE: 132 MMHG | RESPIRATION RATE: 18 BRPM | WEIGHT: 217 LBS | TEMPERATURE: 98.4 F | BODY MASS INDEX: 34.06 KG/M2 | HEIGHT: 67 IN

## 2024-07-05 DIAGNOSIS — T14.8XXA MUSCLE STRAIN: ICD-10-CM

## 2024-07-05 DIAGNOSIS — L90.5 SCAR OF LIP: ICD-10-CM

## 2024-07-05 DIAGNOSIS — G62.9 PERIPHERAL POLYNEUROPATHY: ICD-10-CM

## 2024-07-05 DIAGNOSIS — H92.02 LEFT EAR PAIN: ICD-10-CM

## 2024-07-05 DIAGNOSIS — G89.29 CHRONIC MIDLINE LOW BACK PAIN, UNSPECIFIED WHETHER SCIATICA PRESENT: Primary | ICD-10-CM

## 2024-07-05 DIAGNOSIS — R73.03 PREDIABETES: ICD-10-CM

## 2024-07-05 DIAGNOSIS — M54.50 CHRONIC MIDLINE LOW BACK PAIN, UNSPECIFIED WHETHER SCIATICA PRESENT: Primary | ICD-10-CM

## 2024-07-05 PROBLEM — E66.9 OBESITY (BMI 35.0-39.9 WITHOUT COMORBIDITY): Status: RESOLVED | Noted: 2023-10-21 | Resolved: 2024-07-05

## 2024-07-05 PROBLEM — R07.9 CHEST PAIN: Status: RESOLVED | Noted: 2023-10-21 | Resolved: 2024-07-05

## 2024-07-05 PROBLEM — H53.8 BLURRING OF VISUAL IMAGE: Status: RESOLVED | Noted: 2024-03-21 | Resolved: 2024-07-05

## 2024-07-05 PROBLEM — O36.80X0 PREGNANCY OF UNKNOWN ANATOMIC LOCATION (HHS-HCC): Status: RESOLVED | Noted: 2023-12-31 | Resolved: 2024-07-05

## 2024-07-05 PROBLEM — N39.41 URGE INCONTINENCE: Status: ACTIVE | Noted: 2023-08-21

## 2024-07-05 PROBLEM — R07.89 SENSATION OF CHEST TIGHTNESS: Status: RESOLVED | Noted: 2023-08-23 | Resolved: 2024-07-05

## 2024-07-05 PROBLEM — R00.2 PALPITATIONS: Status: RESOLVED | Noted: 2023-10-21 | Resolved: 2024-07-05

## 2024-07-05 PROBLEM — R05.9 COUGH: Status: RESOLVED | Noted: 2024-03-21 | Resolved: 2024-07-05

## 2024-07-05 RX ORDER — NEOMYCIN SULFATE, POLYMYXIN B SULFATE AND HYDROCORTISONE 10; 3.5; 1 MG/ML; MG/ML; [USP'U]/ML
4 SUSPENSION/ DROPS AURICULAR (OTIC) 4 TIMES DAILY
Qty: 10 ML | Refills: 0 | Status: SHIPPED | OUTPATIENT
Start: 2024-07-05

## 2024-07-05 RX ORDER — MELOXICAM 15 MG/1
15 TABLET ORAL DAILY
Qty: 7 TABLET | Refills: 0 | Status: SHIPPED | OUTPATIENT
Start: 2024-07-05 | End: 2024-07-12

## 2024-07-05 NOTE — PROGRESS NOTES
I reviewed with the resident the medical history and the resident’s findings on physical examination.  I discussed with the resident the patient’s diagnosis and concur with the treatment plan as documented in the resident note.     Lawson Peguero MD

## 2024-07-05 NOTE — ASSESSMENT & PLAN NOTE
-Continued ear discomfort after completing course of antibiotics  -On physical exam today, ears did not appear to have active infection (otitis externa/media), so antibiotics would not be helpful  -Will trial Corticosporin drops for irritation

## 2024-07-05 NOTE — ASSESSMENT & PLAN NOTE
-Has been managed with diet and exercise  -Referral placed to nutritionist to help her with her goals  -Will recheck A1c at physcial

## 2024-07-05 NOTE — PATIENT INSTRUCTIONS
Call Chiropractors in your area to schedule manipulation  Schedule massage therapy session  3-4 wks for physical

## 2024-07-05 NOTE — ASSESSMENT & PLAN NOTE
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
## 2024-07-05 NOTE — ASSESSMENT & PLAN NOTE
-Pain has been ongoing for years, has not had any relief with stretching/exercise  -No concerning finding such as one sided weakness, saddle anesthesia, or sensation deficits that would need imaging  -Could be musculoskeletal, but also related to some metabolic disorders or rheumatologic disorder  -Order TSH, B12, CRP, SedRate, and SHANIKA to assess  -Will order PT referral and recommend massage and chiropractor therapies, continue exercise as tolerated  -Follow up after evaluation and treatment by specialists

## 2024-08-29 ENCOUNTER — APPOINTMENT (OUTPATIENT)
Dept: PRIMARY CARE | Facility: CLINIC | Age: 34
End: 2024-08-29
Payer: COMMERCIAL

## 2024-09-27 ENCOUNTER — APPOINTMENT (OUTPATIENT)
Dept: OBSTETRICS AND GYNECOLOGY | Facility: CLINIC | Age: 34
End: 2024-09-27
Payer: COMMERCIAL

## 2024-10-10 ENCOUNTER — APPOINTMENT (OUTPATIENT)
Dept: OBSTETRICS AND GYNECOLOGY | Facility: CLINIC | Age: 34
End: 2024-10-10
Payer: COMMERCIAL

## 2024-11-01 ENCOUNTER — APPOINTMENT (OUTPATIENT)
Dept: PRIMARY CARE | Facility: CLINIC | Age: 34
End: 2024-11-01
Payer: COMMERCIAL

## 2024-11-04 ENCOUNTER — APPOINTMENT (OUTPATIENT)
Dept: OBSTETRICS AND GYNECOLOGY | Facility: CLINIC | Age: 34
End: 2024-11-04
Payer: COMMERCIAL

## 2024-11-05 ENCOUNTER — APPOINTMENT (OUTPATIENT)
Dept: PRIMARY CARE | Facility: CLINIC | Age: 34
End: 2024-11-05
Payer: COMMERCIAL

## 2024-12-13 ENCOUNTER — APPOINTMENT (OUTPATIENT)
Dept: OBSTETRICS AND GYNECOLOGY | Facility: CLINIC | Age: 34
End: 2024-12-13
Payer: COMMERCIAL

## 2025-01-06 ENCOUNTER — APPOINTMENT (OUTPATIENT)
Dept: OBSTETRICS AND GYNECOLOGY | Facility: CLINIC | Age: 35
End: 2025-01-06
Payer: COMMERCIAL

## 2025-01-07 ENCOUNTER — APPOINTMENT (OUTPATIENT)
Dept: OBSTETRICS AND GYNECOLOGY | Facility: CLINIC | Age: 35
End: 2025-01-07
Payer: COMMERCIAL

## 2025-02-20 ENCOUNTER — APPOINTMENT (OUTPATIENT)
Dept: OBSTETRICS AND GYNECOLOGY | Facility: CLINIC | Age: 35
End: 2025-02-20
Payer: COMMERCIAL

## 2025-03-10 ENCOUNTER — APPOINTMENT (OUTPATIENT)
Dept: OBSTETRICS AND GYNECOLOGY | Facility: CLINIC | Age: 35
End: 2025-03-10
Payer: COMMERCIAL

## 2025-03-10 VITALS — BODY MASS INDEX: 35.08 KG/M2 | DIASTOLIC BLOOD PRESSURE: 70 MMHG | SYSTOLIC BLOOD PRESSURE: 130 MMHG | WEIGHT: 224 LBS

## 2025-03-10 DIAGNOSIS — Z11.3 SCREEN FOR SEXUALLY TRANSMITTED DISEASES: ICD-10-CM

## 2025-03-10 DIAGNOSIS — Z3A.08 8 WEEKS GESTATION OF PREGNANCY (HHS-HCC): ICD-10-CM

## 2025-03-10 DIAGNOSIS — O21.9 NAUSEA AND VOMITING IN PREGNANCY PRIOR TO 22 WEEKS GESTATION: ICD-10-CM

## 2025-03-10 DIAGNOSIS — O99.341 DEPRESSION DURING PREGNANCY IN FIRST TRIMESTER (HHS-HCC): ICD-10-CM

## 2025-03-10 DIAGNOSIS — Z34.91 PRENATAL CARE, FIRST TRIMESTER (HHS-HCC): Primary | ICD-10-CM

## 2025-03-10 DIAGNOSIS — F32.A DEPRESSION DURING PREGNANCY IN FIRST TRIMESTER (HHS-HCC): ICD-10-CM

## 2025-03-10 PROCEDURE — 99214 OFFICE O/P EST MOD 30 MIN: CPT

## 2025-03-10 RX ORDER — PYRIDOXINE HCL (VITAMIN B6) 25 MG
25 TABLET ORAL EVERY 8 HOURS
Qty: 90 TABLET | Refills: 1 | Status: SHIPPED | OUTPATIENT
Start: 2025-03-10 | End: 2025-06-08

## 2025-03-10 RX ORDER — ONDANSETRON 4 MG/1
4 TABLET, FILM COATED ORAL EVERY 12 HOURS PRN
Qty: 6 TABLET | Refills: 0 | Status: SHIPPED | OUTPATIENT
Start: 2025-03-10 | End: 2025-03-13

## 2025-03-10 ASSESSMENT — EDINBURGH POSTNATAL DEPRESSION SCALE (EPDS)
I HAVE BEEN SO UNHAPPY THAT I HAVE HAD DIFFICULTY SLEEPING: YES, MOST OF THE TIME
I HAVE BEEN ANXIOUS OR WORRIED FOR NO GOOD REASON: YES, VERY OFTEN
I HAVE BEEN SO UNHAPPY THAT I HAVE BEEN CRYING: YES, MOST OF THE TIME
TOTAL SCORE: 18
I HAVE BLAMED MYSELF UNNECESSARILY WHEN THINGS WENT WRONG: NOT VERY OFTEN
THINGS HAVE BEEN GETTING ON TOP OF ME: NO, MOST OF THE TIME I HAVE COPED QUITE WELL
I HAVE LOOKED FORWARD WITH ENJOYMENT TO THINGS: DEFINITELY LESS THAN I USED TO
I HAVE FELT SAD OR MISERABLE: YES, QUITE OFTEN
I HAVE BEEN ABLE TO LAUGH AND SEE THE FUNNY SIDE OF THINGS: NOT QUITE SO MUCH NOW
THE THOUGHT OF HARMING MYSELF HAS OCCURRED TO ME: NEVER
I HAVE FELT SCARED OR PANICKY FOR NO GOOD REASON: YES, SOMETIMES

## 2025-03-10 NOTE — PROGRESS NOTES
Subjective   Janna Lee is a 34 y.o.  at Unknown with a working estimated date of delivery of Not found. who presents for an initial prenatal visit.   Patient had bad experience with last epidural reports that it was bvery traumatizing, has been getting physical thereapy. Get very bad pain still when she's in bed and often has problems getting back. Patient reprots that she is very nervous because she wants a natures birht but doesn't.     Patient currently experiencing: nsauea, viomiting, patient reports that she is having some trouble with burping and then vomiting, desires antiemetics at this time.   Bleeding or cramping since LMP: yes - some mild craping at 6 weeks  Taking prenatal vitamin: Yes- working with pregnant with possibilities.  Other concerns today:  Ultrasound completed this pregnancy: No  Last pap:     Prior pregnancy complications: retained placenta, patient reports that she had a mishap with an epidural placement in a previous pregnancy and is very concerned about a repeat epidural.   History of hypertension:  No    OB History    Para Term  AB Living   6 4 4   1 4   SAB IAB Ectopic Multiple Live Births   1       4      # Outcome Date GA Lbr Thomas/2nd Weight Sex Type Anes PTL Lv   6 Current            5 SAB 2023 5w0d          4 Term 2020    F Vag-Spont   ROBBIE   3 Term     M Vag-Spont   ROBBIE   2 Term     M Vag-Spont   ROBBIE   1 Term     M Vag-Spont   ROBBIE         Past Medical History:   Diagnosis Date    Abnormal Pap smear of cervix     Anemia     Hidradenitis suppurativa 2021    Hydradenitis    Hx of chlamydia infection     Hx of gonorrhea     Low grade squamous intraepithelial lesion on cytologic smear of cervix (LGSIL) 2019    LGSIL on Pap smear of cervix    Migraine     Palpitations 10/21/2023    Personal history of other mental and behavioral disorders 2021    History of bipolar disorder    Pregnancy of unknown anatomic location ([facility])  2023    Last Assessment & Plan: Formatting of this note is different from the original. Date of referral: 2023 LMP:  GA at time of referral review: Unknown 33 year old  referred to [facility] for PUL. Lab Results Component Value Date  RH Positive 2023  HB 10.0 (L) 2023   (H) 2023  AST 12 (L) 2023  ALT 9 2023  CREAT 0.67 2023 Lab Results Component Value D    Sensation of chest tightness 2023    Comment on above: CHEST PAIN  Added by Problem List Migration; 2013; Moved to [facility] 2013 9:11PM;  Pt with frequent episides of intermittent sharp CP and L arm tingling/numbnessSuspect non cardiac etiology just based on age and lack of risk factorsAlso 2 negative ED evaluationa and negative stress test and negative EKG;      Past Surgical History:   Procedure Laterality Date    COLPOSCOPY  2014    Colposcopy      Social History     Socioeconomic History    Marital status:    Tobacco Use    Smoking status: Never    Smokeless tobacco: Never   Vaping Use    Vaping status: Never Used   Substance and Sexual Activity    Alcohol use: Not Currently    Drug use: Not Currently    Sexual activity: Yes     Partners: Male     Birth control/protection: None     Social Drivers of Health     Food Insecurity: Unknown (2024)    Received from [facility]    Hunger Vital Sign     Worried About Running Out of Food in the Last Year: Never true   Intimate Partner Violence: Not At Risk (10/24/2023)    Humiliation, Afraid, Rape, and Kick questionnaire     Fear of Current or Ex-Partner: No     Emotionally Abused: No     Physically Abused: No     Sexually Abused: No      [place]  Depression Scale Total: 18    Objective   Physical Exam  Weight: 102 kg (224 lb)  TW.814 kg (4 lb)   Pregravid BMI: 34.45  BP: 130/70      General:   Alert and oriented x 3   Heart:  Lungs: Regular rate, rhythm  Clear to auscultation bilaterally   Thyroid:  Euthyroid, normal shape and size   Breast: Symmetrical, no skin changes/nipple discharge, redness, tenderness, no masses palpated bilaterally   Abdomen: Soft, non tender   Pelvic exam declined.     Assessment   Problem List Items Addressed This Visit          Ob-Gyn Problems    Birth trauma    Overview     Patient had problem with epidural in the past and has residual pain- works with PT.   Plan for anesthesia consult this pregnancy          Nausea and vomiting in pregnancy prior to 22 weeks gestation    Overview     Rx'd unisom, b6, and zofran  Discussed and encouraged small frequent meals throughout day.          Relevant Medications    ondansetron (Zofran) 4 mg tablet    doxylamine (Unisom, doxylamine,) 25 mg tablet    pyridoxine (Vitamin B-6) 25 mg tablet    8 weeks gestation of pregnancy ([facility])    Overview     Desired provider in labor: [x] CNM  [] Physician   [] Either Acceptable  [x] Blood Products: [x] Yes, accepts [] No, needs counseling  [x] Initial BMI: 34.45   [] Prenatal Labs: ordered  [x] Cervical Cancer Screening up to date  [x] Rh status: O pos  [x] Screen for IPV and Substance Use Risk:  [] Genetic Screening (cfDNA):    [] First Trimester Anatomy Screen (11-13.6 wks):  [] Baby ASA (initiated):  [] Pregnancy dated by:     [] Anatomy US: (19-20 wks)  [] Federal Sterilization consent signed (if indicated):  [] 1hr GCT at 24-28wks:  [] Rhogam (if indicated):   [] Fetal Surveillance (if indicated):  [] Tdap (27-32 wks, may be given up to 36 wks if initial window missed):   [] RSV (32-36 wks) (Sept. to end of Jan):     [] Feeding Intentions:  [] Postpartum Birth control method:   [] GBS at 36 - 37 wks:  [] 39 weeks discussion of IOL vs. Expectant management:  [] Mode of delivery ( anticipated ):           Relevant Orders    CBC Anemia Panel With Reflex,Pregnancy    Hgb  A1C    Hemoglobin identification with path review    Rubella Antibody, IgG    Urine Culture clinic collect (Completed)    C.  trachomatis / N. gonorrhoeae, Amplified, Urogenital (Completed)    Type And Screen Is this order related to pregnancy or an upcoming surgery? Yes; Where will this surgery/delivery be performed? [facility]; What is the date of the surgery? 10/15/2025; Has this patient ever had a transfusion? Yes; Gama...    US MAC OB imaging order       Other    Depression during pregnancy in first trimester ([facility])    Overview     Reports safety, no SI/SH/HI. Referral placed to psych.          Relevant Orders    Referral to Psychology     Other Visit Diagnoses       Prenatal care, first trimester ([facility])    -  Primary    Screen for sexually transmitted diseases        Relevant Orders    Hepatitis B surface antigen    Hepatitis C antibody    HIV 1/2 Antigen/Antibody Screen with Reflex to Confirmation    Syphilis Screen with Reflex             Plan   - New OB resources provided and reviewed with particular attention to dietary, travel, and medication restrictions  - Oriented to practice, CNM vs. MD care  - Reviewed IOM recommendations for weight gain given pt's BMI: 11-20 pounds (BMI greater than or equal to 30)  - Reviewed bleeding precautions, warning signs, when to call provider; phone number provided  - Routine NOB labs ordered  - NIPT discussed with patient: patient desires. Pre-test genetic counseling discussed and included:   Interpretation of family and medical histories to assess the probability of disease occurrence or recurrence;   Education about inheritance, genetic testing, disease management, prevention and resources  Counseling to promote informed choices and adaptation to the risk or presented of a genetic condition  Counseling for psychological aspects of genetic testing.  - Nuchal Translucency ultrasound ordered  - Return in 4 weeks for routine prenatal care    TRACEY Chaudhary-CN

## 2025-03-12 LAB
BACTERIA UR CULT: NORMAL
C TRACH RRNA SPEC QL NAA+PROBE: NOT DETECTED
N GONORRHOEA RRNA SPEC QL NAA+PROBE: NOT DETECTED
QUEST GC CT AMPLIFIED (ALWAYS MESSAGE): NORMAL

## 2025-03-13 PROBLEM — Z3A.08 8 WEEKS GESTATION OF PREGNANCY (HHS-HCC): Status: ACTIVE | Noted: 2025-03-13

## 2025-03-13 PROBLEM — O99.341 DEPRESSION DURING PREGNANCY IN FIRST TRIMESTER (HHS-HCC): Status: ACTIVE | Noted: 2024-03-21

## 2025-03-13 PROBLEM — O21.9 NAUSEA AND VOMITING IN PREGNANCY PRIOR TO 22 WEEKS GESTATION: Status: ACTIVE | Noted: 2025-03-13

## 2025-03-19 ENCOUNTER — APPOINTMENT (OUTPATIENT)
Dept: BEHAVIORAL HEALTH | Facility: CLINIC | Age: 35
End: 2025-03-19
Payer: COMMERCIAL

## 2025-04-07 ENCOUNTER — APPOINTMENT (OUTPATIENT)
Dept: OBSTETRICS AND GYNECOLOGY | Facility: CLINIC | Age: 35
End: 2025-04-07
Payer: COMMERCIAL

## 2025-04-14 ENCOUNTER — APPOINTMENT (OUTPATIENT)
Dept: OBSTETRICS AND GYNECOLOGY | Facility: CLINIC | Age: 35
End: 2025-04-14
Payer: COMMERCIAL

## 2025-04-14 VITALS — BODY MASS INDEX: 34.3 KG/M2 | WEIGHT: 219 LBS | DIASTOLIC BLOOD PRESSURE: 82 MMHG | SYSTOLIC BLOOD PRESSURE: 112 MMHG

## 2025-04-14 DIAGNOSIS — O99.341 DEPRESSION DURING PREGNANCY IN FIRST TRIMESTER (HHS-HCC): ICD-10-CM

## 2025-04-14 DIAGNOSIS — Z3A.13 13 WEEKS GESTATION OF PREGNANCY (HHS-HCC): ICD-10-CM

## 2025-04-14 DIAGNOSIS — Z34.81 ENCOUNTER FOR SUPERVISION OF NORMAL PREGNANCY IN MULTIGRAVIDA IN FIRST TRIMESTER: Primary | ICD-10-CM

## 2025-04-14 DIAGNOSIS — F32.A DEPRESSION DURING PREGNANCY IN FIRST TRIMESTER (HHS-HCC): ICD-10-CM

## 2025-04-14 DIAGNOSIS — O21.9 NAUSEA AND VOMITING IN PREGNANCY PRIOR TO 22 WEEKS GESTATION: ICD-10-CM

## 2025-04-14 PROCEDURE — 83021 HEMOGLOBIN CHROMOTOGRAPHY: CPT

## 2025-04-14 PROCEDURE — 86901 BLOOD TYPING SEROLOGIC RH(D): CPT

## 2025-04-14 PROCEDURE — 85027 COMPLETE CBC AUTOMATED: CPT

## 2025-04-14 PROCEDURE — 82728 ASSAY OF FERRITIN: CPT

## 2025-04-14 PROCEDURE — 99213 OFFICE O/P EST LOW 20 MIN: CPT

## 2025-04-14 PROCEDURE — 86850 RBC ANTIBODY SCREEN: CPT

## 2025-04-14 PROCEDURE — 83550 IRON BINDING TEST: CPT

## 2025-04-14 PROCEDURE — 86900 BLOOD TYPING SEROLOGIC ABO: CPT

## 2025-04-14 ASSESSMENT — ENCOUNTER SYMPTOMS
RESPIRATORY NEGATIVE: 0
ENDOCRINE NEGATIVE: 0
GASTROINTESTINAL NEGATIVE: 0
CARDIOVASCULAR NEGATIVE: 0
ALLERGIC/IMMUNOLOGIC NEGATIVE: 0
MUSCULOSKELETAL NEGATIVE: 0
NEUROLOGICAL NEGATIVE: 0
HEMATOLOGIC/LYMPHATIC NEGATIVE: 0
CONSTITUTIONAL NEGATIVE: 0
EYES NEGATIVE: 0
PSYCHIATRIC NEGATIVE: 0

## 2025-04-14 NOTE — PROGRESS NOTES
Assessment/Plan   Diagnoses and all orders for this visit:  Encounter for supervision of normal pregnancy in multigravida in first trimester  -     Myriad Prequel Prenatal Screen; Future  13 weeks gestation of pregnancy ([facility])  Depression during pregnancy in first trimester ([facility])  Nausea and vomiting in pregnancy prior to 22 weeks gestation      Encouraged patient to complete prenatal labs, not drawn at time of appointment today.   Encouraged patient to call and schedule Ultrasound   Myriad order placed today  Social work to assist connection with mental health resources.   Reviewed warning signs and when to call provider  Follow up in 4 weeks for a routine prenatal visit.    Yanet Reynoso, APRN-CNM    Subjective   Janna Lee is a 34 y.o.  at 13w5d with a working estimated date of delivery of 10/15/2025, by Last Menstrual Period who presents for a routine prenatal visit. She denies vaginal bleeding and cramping.       Her pregnancy is complicated by:  Problem List Items Addressed This Visit          Ob-Gyn Problems    Nausea and vomiting in pregnancy prior to 22 weeks gestation    Overview     Rx'd unisom, b6, and zofran  Discussed and encouraged small frequent meals throughout day.          13 weeks gestation of pregnancy ([facility])    Overview     Desired provider in labor: [x] CNM  [] Physician   [] Either Acceptable  [x] Blood Products: [x] Yes, accepts [] No, needs counseling  [x] Initial BMI: 34.45   [] Prenatal Labs: ordered  [x] Cervical Cancer Screening up to date  [x] Rh status: O pos  [x] Screen for IPV and Substance Use Risk:  [x] Genetic Screening (cfDNA): ordered 4/15/25  [] First Trimester Anatomy Screen (11-13.6 wks):  [] Baby ASA (initiated):  [] Pregnancy dated by:     [] Anatomy US: (19-20 wks)  [] Federal Sterilization consent signed (if indicated):  [] 1hr GCT at 24-28wks:  [] Rhogam (if indicated):   [] Fetal Surveillance (if indicated):  [] Tdap (27-32 wks, may be given up to  36 wks if initial window missed):   [] RSV (32-36 wks) (Sept. to end of Jan):     [] Feeding Intentions:  [] Postpartum Birth control method:   [] GBS at 36 - 37 wks:  [] 39 weeks discussion of IOL vs. Expectant management:  [] Mode of delivery ( anticipated ):              Other    Depression during pregnancy in first trimester ([facility])    Overview     Reports safety, no SI/SH/HI. Referral placed to psych.           Other Visit Diagnoses       Encounter for supervision of normal pregnancy in multigravida in first trimester    -  Primary    Relevant Orders    Myriad Prequel Prenatal Screen            Objective   Physical Exam  Weight: 99.3 kg (219 lb), Pregravid BMI: 34.45  Expected Total Weight Gain: 5 kg (11 lb)-9 kg (19 lb)   BP: 112/82  Fetal Heart Rate: 150

## 2025-04-15 ENCOUNTER — APPOINTMENT (OUTPATIENT)
Dept: LAB | Facility: HOSPITAL | Age: 35
End: 2025-04-15
Payer: COMMERCIAL

## 2025-04-15 ENCOUNTER — TELEPHONE (OUTPATIENT)
Dept: OBSTETRICS AND GYNECOLOGY | Facility: CLINIC | Age: 35
End: 2025-04-15
Payer: COMMERCIAL

## 2025-04-15 DIAGNOSIS — Z91.89 AT RISK FOR CONSTIPATION: ICD-10-CM

## 2025-04-15 DIAGNOSIS — D50.9 IRON DEFICIENCY ANEMIA OF MOTHER DURING PREGNANCY: ICD-10-CM

## 2025-04-15 DIAGNOSIS — O99.019 IRON DEFICIENCY ANEMIA OF MOTHER DURING PREGNANCY: ICD-10-CM

## 2025-04-15 DIAGNOSIS — O99.012 ANEMIA AFFECTING PREGNANCY IN SECOND TRIMESTER: Primary | ICD-10-CM

## 2025-04-15 PROBLEM — Z3A.13 13 WEEKS GESTATION OF PREGNANCY (HHS-HCC): Status: ACTIVE | Noted: 2025-03-13

## 2025-04-15 LAB
ABO GROUP (TYPE) IN BLOOD: NORMAL
ANTIBODY SCREEN: NORMAL
ERYTHROCYTE [DISTWIDTH] IN BLOOD BY AUTOMATED COUNT: 19.7 % (ref 11.5–14.5)
FERRITIN SERPL-MCNC: 8 NG/ML
HCT VFR BLD AUTO: 36.8 % (ref 36–46)
HEMOGLOBIN A2: 2.4 % (ref 2–3.5)
HEMOGLOBIN A: 97.4 % (ref 95.8–98)
HEMOGLOBIN F: 0.2 % (ref 0–2)
HEMOGLOBIN IDENTIFICATION INTERPRETATION: NORMAL
HGB BLD-MCNC: 10.8 G/DL (ref 12–16)
IRON SATN MFR SERPL: NORMAL %
IRON SERPL-MCNC: 34 UG/DL
MCH RBC QN AUTO: 22.7 PG (ref 26–34)
MCHC RBC AUTO-ENTMCNC: 29.3 G/DL (ref 32–36)
MCV RBC AUTO: 77 FL (ref 80–100)
NRBC BLD-RTO: 0 /100 WBCS (ref 0–0)
PATH REVIEW-HGB IDENTIFICATION: NORMAL
PLATELET # BLD AUTO: 394 X10*3/UL (ref 150–450)
RBC # BLD AUTO: 4.76 X10*6/UL (ref 4–5.2)
REFLEX ADDED, ANEMIA PANEL: NORMAL
RH FACTOR (ANTIGEN D): NORMAL
TIBC SERPL-MCNC: NORMAL UG/DL
UIBC SERPL-MCNC: >450 UG/DL
WBC # BLD AUTO: 8.2 X10*3/UL (ref 4.4–11.3)

## 2025-04-15 RX ORDER — FERROUS SULFATE 325(65) MG
325 TABLET, DELAYED RELEASE (ENTERIC COATED) ORAL 2 TIMES DAILY
Qty: 60 TABLET | Refills: 11 | Status: SHIPPED | OUTPATIENT
Start: 2025-04-15 | End: 2026-04-15

## 2025-04-15 RX ORDER — DOCUSATE SODIUM 100 MG/1
100 CAPSULE, LIQUID FILLED ORAL 2 TIMES DAILY PRN
Qty: 60 CAPSULE | Refills: 5 | Status: SHIPPED | OUTPATIENT
Start: 2025-04-15

## 2025-04-15 NOTE — TELEPHONE ENCOUNTER
"TELEPHONE CALL TO PATIENT  Identified by name and date of birth.  Reviewed results and treatment plan  Anemia, Ferrous sulfate 8.   PO Iron twice a day, every other day  Discussed diet, constipation, requesting stool softener.  \"I'm already constipate\"  Patient verbalizes understanding   "

## 2025-04-15 NOTE — TELEPHONE ENCOUNTER
----- Message from Yanet Reynoso sent at 4/15/2025  1:41 PM EDT -----  Regarding: Anemia  This patient has anemia in the second trimester. I have sent over ferrous sulfate 2x/day every other day to her pharmacy. Can you call and let her know? Thanks!  ----- Message -----  From: [employer] Results In  Sent: 3/11/2025   4:08 PM EDT  To: SWATI Chaudhary

## 2025-04-16 LAB
EST. AVERAGE GLUCOSE BLD GHB EST-MCNC: 117 MG/DL
EST. AVERAGE GLUCOSE BLD GHB EST-SCNC: 6.5 MMOL/L
HBA1C MFR BLD: 5.7 %
HBV SURFACE AG SERPL QL IA: NORMAL
HCV AB SERPL QL IA: NORMAL
HIV 1+2 AB+HIV1 P24 AG SERPL QL IA: NORMAL
RUBV IGG SERPL IA-ACNC: 2.19 INDEX
T PALLIDUM AB SER QL IA: NEGATIVE

## 2025-04-17 PROBLEM — O99.019 IRON DEFICIENCY ANEMIA OF MOTHER DURING PREGNANCY: Status: ACTIVE | Noted: 2025-04-17

## 2025-04-17 PROBLEM — D50.9 IRON DEFICIENCY ANEMIA OF MOTHER DURING PREGNANCY: Status: ACTIVE | Noted: 2025-04-17

## 2025-04-21 ENCOUNTER — HOSPITAL ENCOUNTER (OUTPATIENT)
Dept: RADIOLOGY | Facility: CLINIC | Age: 35
Discharge: HOME | End: 2025-04-21
Payer: COMMERCIAL

## 2025-04-21 DIAGNOSIS — Z3A.08 8 WEEKS GESTATION OF PREGNANCY (HHS-HCC): ICD-10-CM

## 2025-04-21 PROCEDURE — 76815 OB US LIMITED FETUS(S): CPT

## 2025-04-21 PROCEDURE — 76815 OB US LIMITED FETUS(S): CPT | Performed by: OBSTETRICS & GYNECOLOGY

## 2025-04-22 LAB
COMMENTS - MP RESULT TYPE: NORMAL
SCAN RESULT: NORMAL

## 2025-04-24 ENCOUNTER — PATIENT MESSAGE (OUTPATIENT)
Dept: OBSTETRICS AND GYNECOLOGY | Facility: CLINIC | Age: 35
End: 2025-04-24
Payer: COMMERCIAL

## 2025-04-24 DIAGNOSIS — O99.012 ANEMIA AFFECTING PREGNANCY IN SECOND TRIMESTER: ICD-10-CM

## 2025-04-28 RX ORDER — FERROUS SULFATE 325(65) MG
325 TABLET, DELAYED RELEASE (ENTERIC COATED) ORAL 2 TIMES DAILY
Qty: 60 TABLET | Refills: 11 | Status: SHIPPED | OUTPATIENT
Start: 2025-04-28 | End: 2026-04-28

## 2025-05-05 ENCOUNTER — APPOINTMENT (OUTPATIENT)
Dept: OBSTETRICS AND GYNECOLOGY | Facility: CLINIC | Age: 35
End: 2025-05-05
Payer: COMMERCIAL

## 2025-05-05 VITALS — DIASTOLIC BLOOD PRESSURE: 76 MMHG | WEIGHT: 220 LBS | BODY MASS INDEX: 34.46 KG/M2 | SYSTOLIC BLOOD PRESSURE: 110 MMHG

## 2025-05-05 DIAGNOSIS — O99.012 ANEMIA AFFECTING PREGNANCY IN SECOND TRIMESTER: ICD-10-CM

## 2025-05-05 DIAGNOSIS — R51.9 HEADACHE IN PREGNANCY, ANTEPARTUM (HHS-HCC): ICD-10-CM

## 2025-05-05 DIAGNOSIS — Z34.82 ENCOUNTER FOR SUPERVISION OF NORMAL PREGNANCY IN MULTIGRAVIDA IN SECOND TRIMESTER: Primary | ICD-10-CM

## 2025-05-05 DIAGNOSIS — Z3A.16 16 WEEKS GESTATION OF PREGNANCY (HHS-HCC): ICD-10-CM

## 2025-05-05 DIAGNOSIS — O26.899 HEADACHE IN PREGNANCY, ANTEPARTUM (HHS-HCC): ICD-10-CM

## 2025-05-05 PROCEDURE — 99213 OFFICE O/P EST LOW 20 MIN: CPT

## 2025-05-05 RX ORDER — FERROUS SULFATE 325(65) MG
325 TABLET, DELAYED RELEASE (ENTERIC COATED) ORAL 2 TIMES DAILY
Qty: 60 TABLET | Refills: 11 | Status: SHIPPED | OUTPATIENT
Start: 2025-05-05 | End: 2026-05-05

## 2025-05-05 ASSESSMENT — ENCOUNTER SYMPTOMS
ENDOCRINE NEGATIVE: 0
GASTROINTESTINAL NEGATIVE: 0
CONSTITUTIONAL NEGATIVE: 0
NEUROLOGICAL NEGATIVE: 0
EYES NEGATIVE: 0
HEMATOLOGIC/LYMPHATIC NEGATIVE: 0
RESPIRATORY NEGATIVE: 0
CARDIOVASCULAR NEGATIVE: 0
PSYCHIATRIC NEGATIVE: 0
ALLERGIC/IMMUNOLOGIC NEGATIVE: 0
MUSCULOSKELETAL NEGATIVE: 0

## 2025-05-05 NOTE — PROGRESS NOTES
Assessment/Plan   Diagnoses and all orders for this visit:  Encounter for supervision of normal pregnancy in multigravida in second trimester  Anemia affecting pregnancy in second trimester  -     ferrous sulfate 325 (65 Fe) mg EC tablet; Take 1 tablet by mouth 2 times a day. Every other day.   Do not crush, chew, or split.  16 weeks gestation of pregnancy ([facility])  -     prenatal vitamin, iron-folic, 27 mg iron-800 mcg folic acid tablet; Take 1 tablet by mouth once daily.  Headache in pregnancy, antepartum ([facility])  -     Referral to Neurology; Future  -     Protein, Urine Random; Future      Labs reviewed.  Encouraged hydration and use of tylenol.   Reviewed s/sx of PTL, warning signs, fetal movement counts, and when to call provider  Follow up in 4 weeks for a routine prenatal visit.    SWATI Chaudhary    Subjective     Janna Lee is a 34 y.o.  at 16w5d with a working estimated date of delivery of 10/15/2025, by Last Menstrual Period who presents for a routine prenatal visit. She denies vaginal bleeding, leakage of fluid, or contractions.    Patient complains of bad headache, reports no vision changes. Patient reports low hydration. Reports that she is nervous drinking more will cause her to have to go to the bathroom too often.     Her pregnancy is complicated by:  Problem List Items Addressed This Visit    None  Visit Diagnoses         Encounter for supervision of normal pregnancy in multigravida in second trimester    -  Primary      Anemia affecting pregnancy in second trimester        Relevant Medications    ferrous sulfate 325 (65 Fe) mg EC tablet      16 weeks gestation of pregnancy ([facility])        Relevant Medications    prenatal vitamin, iron-folic, 27 mg iron-800 mcg folic acid tablet      Headache in pregnancy, antepartum ([facility])        Relevant Orders    Referral to Neurology    Protein, Urine Random (Completed)            Objective   Physical Exam  Weight: 99.8 kg (220  lb)  Expected Total Weight Gain: 5 kg (11 lb)-9 kg (19 lb)   Pregravid BMI: 34.45  BP: 110/76  Fetal Heart Rate: 145

## 2025-05-06 LAB
CREAT UR-MCNC: 391 MG/DL (ref 20–275)
PROT UR-MCNC: 30 MG/DL (ref 5–24)
PROT/CREAT UR: 0.08 MG/MG CREAT (ref 0.02–0.18)
PROT/CREAT UR: 77 MG/G CREAT (ref 24–184)

## 2025-05-10 ASSESSMENT — ENCOUNTER SYMPTOMS
HEADACHES: 1
ABDOMINAL PAIN: 1

## 2025-05-16 ENCOUNTER — ROUTINE PRENATAL (OUTPATIENT)
Dept: OBSTETRICS AND GYNECOLOGY | Facility: CLINIC | Age: 35
End: 2025-05-16
Payer: COMMERCIAL

## 2025-05-16 VITALS — BODY MASS INDEX: 34.77 KG/M2 | SYSTOLIC BLOOD PRESSURE: 135 MMHG | WEIGHT: 222 LBS | DIASTOLIC BLOOD PRESSURE: 75 MMHG

## 2025-05-16 DIAGNOSIS — N39.41 URGE INCONTINENCE: ICD-10-CM

## 2025-05-16 DIAGNOSIS — Z3A.18 18 WEEKS GESTATION OF PREGNANCY (HHS-HCC): Primary | ICD-10-CM

## 2025-05-16 DIAGNOSIS — O21.9 NAUSEA AND VOMITING IN PREGNANCY PRIOR TO 22 WEEKS GESTATION: ICD-10-CM

## 2025-05-16 PROCEDURE — 99213 OFFICE O/P EST LOW 20 MIN: CPT | Mod: TH

## 2025-05-16 NOTE — PROGRESS NOTES
Subjective   Janna Lee is a 34 y.o.  at 18w2d with a working estimated date of delivery of 10/15/2025, by Last Menstrual Period who presents for Centering #2.     1:1 Visit:  She denies vaginal bleeding, leakage of fluid, or strong pelvic pain.    Objective   Physical Exam     Expected Total Weight Gain: 5 kg (11 lb)-9 kg (19 lb)   Pregravid BMI: 34.45     Fetal Heart Rate: 160      Problem List Items Addressed This Visit          Ob-Gyn Problems    Urge incontinence    Birth trauma    Overview   Patient had problem with epidural in the past and has residual pain- works with PT.   Plan for anesthesia consult this pregnancy          Nausea and vomiting in pregnancy prior to 22 weeks gestation    Overview   Rx'd unisom, b6, and zofran  Discussed and encouraged small frequent meals throughout day.          18 weeks gestation of pregnancy ([facility]) - Primary    Overview   Desired provider in labor: [x] CNM  [] Physician   [] Either Acceptable  [x] Blood Products: [x] Yes, accepts [] No, needs counseling  [x] Initial BMI: 34.45   [] Prenatal Labs: ordered  [x] Cervical Cancer Screening up to date  [x] Rh status: O pos  [x] Screen for IPV and Substance Use Risk:  [x] Genetic Screening (cfDNA): ordered 4/15/25  [x] First Trimester Anatomy Screen (11-13.6 wks):  [x] Baby ASA (initiated):  [x] Pregnancy dated by: LMP    [] Anatomy US: (19-20 wks)  [] Federal Sterilization consent signed (if indicated):  [] 1hr GCT at 24-28wks:  [] Rhogam (if indicated):   [] Fetal Surveillance (if indicated):  [] Tdap (27-32 wks, may be given up to 36 wks if initial window missed):   [] RSV (32-36 wks) (Sept. to end ):     [] Feeding Intentions:  [] Postpartum Birth control method:   [] GBS at 36 - 37 wks:  [] 39 weeks discussion of IOL vs. Expectant management:  [] Mode of delivery ( anticipated ):              Centering Session #2 Plan:   Anatomy US scheduled today  -The following educational material was  reviewed:  Common discomforts/Changes in body  Self-Care / back care  Dental health  -Reviewed reasons to call CNM on-call:  vaginal bleeding, loss of fluid, severe pelvic pain, or any questions/concerns  -RTC for next Centering visit in 4 weeks or prn   next visit:    1:1 total time 10min  Centering Visit total time 120min    TRACEY Chaudhary-CNM

## 2025-05-23 ENCOUNTER — HOSPITAL ENCOUNTER (OUTPATIENT)
Dept: RADIOLOGY | Facility: CLINIC | Age: 35
End: 2025-05-23
Payer: COMMERCIAL

## 2025-05-28 ENCOUNTER — HOSPITAL ENCOUNTER (OUTPATIENT)
Dept: RADIOLOGY | Facility: CLINIC | Age: 35
Discharge: HOME | End: 2025-05-28
Payer: COMMERCIAL

## 2025-05-28 DIAGNOSIS — E66.811 CLASS 1 OBESITY: ICD-10-CM

## 2025-05-28 DIAGNOSIS — Z3A.08 8 WEEKS GESTATION OF PREGNANCY (HHS-HCC): ICD-10-CM

## 2025-05-28 PROCEDURE — 76811 OB US DETAILED SNGL FETUS: CPT

## 2025-05-28 PROCEDURE — 76805 OB US >/= 14 WKS SNGL FETUS: CPT

## 2025-05-28 PROCEDURE — 76811 OB US DETAILED SNGL FETUS: CPT | Performed by: OBSTETRICS & GYNECOLOGY

## 2025-06-05 DIAGNOSIS — Z3A.16 16 WEEKS GESTATION OF PREGNANCY (HHS-HCC): ICD-10-CM

## 2025-06-05 RX ORDER — PRENATAL VIT NO.126/IRON/FOLIC 28MG-0.8MG
1 TABLET ORAL DAILY
Qty: 30 TABLET | Refills: 11 | Status: SHIPPED | OUTPATIENT
Start: 2025-06-05

## 2025-06-13 ENCOUNTER — ROUTINE PRENATAL (OUTPATIENT)
Dept: OBSTETRICS AND GYNECOLOGY | Facility: CLINIC | Age: 35
End: 2025-06-13
Payer: COMMERCIAL

## 2025-06-13 VITALS — DIASTOLIC BLOOD PRESSURE: 80 MMHG | SYSTOLIC BLOOD PRESSURE: 129 MMHG | BODY MASS INDEX: 35.55 KG/M2 | WEIGHT: 227 LBS

## 2025-06-13 DIAGNOSIS — O21.9 NAUSEA AND VOMITING IN PREGNANCY PRIOR TO 22 WEEKS GESTATION: ICD-10-CM

## 2025-06-13 DIAGNOSIS — D50.9 IRON DEFICIENCY ANEMIA OF MOTHER DURING PREGNANCY: ICD-10-CM

## 2025-06-13 DIAGNOSIS — O99.341 DEPRESSION DURING PREGNANCY IN FIRST TRIMESTER (HHS-HCC): ICD-10-CM

## 2025-06-13 DIAGNOSIS — Z3A.18 18 WEEKS GESTATION OF PREGNANCY (HHS-HCC): ICD-10-CM

## 2025-06-13 DIAGNOSIS — Z34.82 PRENATAL CARE, SUBSEQUENT PREGNANCY, SECOND TRIMESTER (HHS-HCC): Primary | ICD-10-CM

## 2025-06-13 DIAGNOSIS — O99.019 IRON DEFICIENCY ANEMIA OF MOTHER DURING PREGNANCY: ICD-10-CM

## 2025-06-13 DIAGNOSIS — F32.A DEPRESSION DURING PREGNANCY IN FIRST TRIMESTER (HHS-HCC): ICD-10-CM

## 2025-06-13 PROCEDURE — 99213 OFFICE O/P EST LOW 20 MIN: CPT | Mod: TH

## 2025-06-13 NOTE — PROGRESS NOTES
Subjective   Janna Lee is a 34 y.o.  at 22w2d with a working estimated date of delivery of 10/15/2025, by Last Menstrual Period who presents for Centering #3.     1:1 Visit:  She denies vaginal bleeding, leakage of fluid or contractions/strong pelvic pain.Patient endorses fetal movement.     Objective   Physical Exam:   Weight: 103 kg (227 lb)  Expected Total Weight Gain: 5 kg (11 lb)-9 kg (19 lb)   Pregravid BMI: 34.45  BP: 129/80  Fetal Heart Rate: 155 Fundal Height (cm): 22 cm             Postpartum Depression: High Risk (3/10/2025)    [place]  Depression Scale     Last EPDS Total Score: 18     Last EPDS Self Harm Result: Never            Problem List Items Addressed This Visit          Ob-Gyn Problems    Birth trauma    Overview   Patient had problem with epidural in the past and has residual pain- works with PT.   Plan for anesthesia consult this pregnancy          Nausea and vomiting in pregnancy prior to 22 weeks gestation    Overview   Rx'd unisom, b6, and zofran  Discussed and encouraged small frequent meals throughout day.          18 weeks gestation of pregnancy ([facility])    Overview   Desired provider in labor: [x] CNM  [] Physician   [] Either Acceptable  [x] Blood Products: [x] Yes, accepts [] No, needs counseling  [x] Initial BMI: 34.45   [] Prenatal Labs: ordered  [x] Cervical Cancer Screening up to date  [x] Rh status: O pos  [x] Screen for IPV and Substance Use Risk:  [x] Genetic Screening (cfDNA): ordered 4/15/25  [x] First Trimester Anatomy Screen (11-13.6 wks):  [x] Baby ASA (initiated):  [x] Pregnancy dated by: LMP    [] Anatomy US: (19-20 wks)  [] Federal Sterilization consent signed (if indicated):  [] 1hr GCT at 24-28wks:  [] Rhogam (if indicated):   [] Fetal Surveillance (if indicated):  [] Tdap (27-32 wks, may be given up to 36 wks if initial window missed):   [] RSV (32-36 wks) (Sept. to end of ):     [] Feeding Intentions:  [] Postpartum Birth control method:    [] GBS at 36 - 37 wks:  [] 39 weeks discussion of IOL vs. Expectant management:  [] Mode of delivery ( anticipated ):              Other    Depression during pregnancy in first trimester ([facility])    Overview   Reports safety, no SI/SH/HI. Referral placed to psych.          Iron deficiency anemia of mother during pregnancy     Other Visit Diagnoses         Prenatal care, subsequent pregnancy, second trimester ([facility])    -  Primary            Centering Session #3 Plan:   Anatomy US reviewed WNL  Discussed diabetes screening and routine labs, to be completed next visit  -IPV screening completed   -The following educational material was reviewed:  Dealing with stressors  Mental relaxation practice  Intimate partner violence    labor signs/symptoms  -Reviewed reasons to call CNM on-call:  vaginal bleeding, loss of fluid, severe pelvic pain, or any questions/concerns  -RTC for next Centering visit in 4 weeks or prn   next visit:    1:1 total time 10min  Centering Visit total time 120min     TRACEY Chaudhary-CAROLM

## 2025-07-09 ENCOUNTER — TELEPHONE (OUTPATIENT)
Dept: OBSTETRICS AND GYNECOLOGY | Facility: CLINIC | Age: 35
End: 2025-07-09
Payer: COMMERCIAL

## 2025-07-09 NOTE — TELEPHONE ENCOUNTER
Called pt back and notified that her rx for pnv has several refills, just to call her pharmacy. Pt also stating she is having [facility] Resent order for 3rd time and added breast pump and compression hose

## 2025-07-11 ENCOUNTER — APPOINTMENT (OUTPATIENT)
Dept: OBSTETRICS AND GYNECOLOGY | Facility: CLINIC | Age: 35
End: 2025-07-11
Payer: COMMERCIAL

## 2025-07-16 ENCOUNTER — HOSPITAL ENCOUNTER (OUTPATIENT)
Facility: HOSPITAL | Age: 35
Discharge: HOME | End: 2025-07-16
Attending: OBSTETRICS & GYNECOLOGY | Admitting: OBSTETRICS & GYNECOLOGY
Payer: COMMERCIAL

## 2025-07-16 ENCOUNTER — APPOINTMENT (OUTPATIENT)
Dept: OBSTETRICS AND GYNECOLOGY | Facility: HOSPITAL | Age: 35
End: 2025-07-16
Payer: COMMERCIAL

## 2025-07-16 ENCOUNTER — DOCUMENTATION (OUTPATIENT)
Dept: OBSTETRICS AND GYNECOLOGY | Facility: CLINIC | Age: 35
End: 2025-07-16

## 2025-07-16 ENCOUNTER — HOSPITAL ENCOUNTER (OUTPATIENT)
Facility: HOSPITAL | Age: 35
End: 2025-07-16
Attending: OBSTETRICS & GYNECOLOGY | Admitting: OBSTETRICS & GYNECOLOGY
Payer: COMMERCIAL

## 2025-07-16 VITALS
DIASTOLIC BLOOD PRESSURE: 70 MMHG | OXYGEN SATURATION: 100 % | RESPIRATION RATE: 16 BRPM | TEMPERATURE: 97.7 F | SYSTOLIC BLOOD PRESSURE: 124 MMHG | HEART RATE: 86 BPM

## 2025-07-16 DIAGNOSIS — Z3A.18 18 WEEKS GESTATION OF PREGNANCY (HHS-HCC): Primary | ICD-10-CM

## 2025-07-16 DIAGNOSIS — K21.9 GASTROESOPHAGEAL REFLUX DISEASE WITHOUT ESOPHAGITIS: ICD-10-CM

## 2025-07-16 LAB
ATRIAL RATE: 94 BPM
BILIRUBIN, POC: NEGATIVE
BLOOD URINE, POC: NEGATIVE
BNP SERPL-MCNC: 10 PG/ML (ref 0–99)
CARDIAC TROPONIN I PNL SERPL HS: <3 NG/L (ref 0–34)
CLARITY, POC: CLEAR
COLOR, POC: YELLOW
GLUCOSE URINE, POC: NEGATIVE
KETONES, POC: POSITIVE
LEUKOCYTE EST, POC: NEGATIVE
NITRITE, POC: NEGATIVE
P AXIS: 76 DEGREES
P OFFSET: 192 MS
P ONSET: 138 MS
PH, POC: 6.5
POC APPEARANCE OF BODY FLUID: NORMAL
PR INTERVAL: 158 MS
Q ONSET: 217 MS
QRS COUNT: 15 BEATS
QRS DURATION: 80 MS
QT INTERVAL: 338 MS
QTC CALCULATION(BAZETT): 422 MS
QTC FREDERICIA: 392 MS
R AXIS: 78 DEGREES
SPECIFIC GRAVITY, POC: 1.03
T AXIS: 33 DEGREES
T OFFSET: 386 MS
URINE PROTEIN, POC: NORMAL
UROBILINOGEN, POC: 0.2
VENTRICULAR RATE: 94 BPM

## 2025-07-16 PROCEDURE — 99214 OFFICE O/P EST MOD 30 MIN: CPT | Mod: 25,27

## 2025-07-16 PROCEDURE — 83880 ASSAY OF NATRIURETIC PEPTIDE: CPT

## 2025-07-16 PROCEDURE — 36415 COLL VENOUS BLD VENIPUNCTURE: CPT

## 2025-07-16 PROCEDURE — 93005 ELECTROCARDIOGRAM TRACING: CPT | Mod: 59

## 2025-07-16 PROCEDURE — 59025 FETAL NON-STRESS TEST: CPT

## 2025-07-16 PROCEDURE — 84484 ASSAY OF TROPONIN QUANT: CPT

## 2025-07-16 PROCEDURE — 99214 OFFICE O/P EST MOD 30 MIN: CPT

## 2025-07-16 PROCEDURE — 99283 EMERGENCY DEPT VISIT LOW MDM: CPT | Mod: 25

## 2025-07-16 PROCEDURE — 59025 FETAL NON-STRESS TEST: CPT | Mod: GC

## 2025-07-16 PROCEDURE — 2500000001 HC RX 250 WO HCPCS SELF ADMINISTERED DRUGS (ALT 637 FOR MEDICARE OP): Mod: SE

## 2025-07-16 PROCEDURE — 4500999001 HC ED NO CHARGE

## 2025-07-16 RX ORDER — CALCIUM CARBONATE 200(500)MG
500 TABLET,CHEWABLE ORAL ONCE
Status: COMPLETED | OUTPATIENT
Start: 2025-07-16 | End: 2025-07-16

## 2025-07-16 RX ORDER — CYCLOBENZAPRINE HCL 10 MG
10 TABLET ORAL ONCE
Status: COMPLETED | OUTPATIENT
Start: 2025-07-16 | End: 2025-07-16

## 2025-07-16 RX ORDER — CALCIUM CARBONATE 200(500)MG
500 TABLET,CHEWABLE ORAL DAILY
Status: DISCONTINUED | OUTPATIENT
Start: 2025-07-16 | End: 2025-07-16

## 2025-07-16 RX ORDER — FAMOTIDINE 20 MG/1
20 TABLET, FILM COATED ORAL 2 TIMES DAILY
Qty: 60 TABLET | Refills: 2 | Status: SHIPPED | OUTPATIENT
Start: 2025-07-16 | End: 2025-10-14

## 2025-07-16 RX ORDER — FAMOTIDINE 40 MG/1
40 TABLET, FILM COATED ORAL ONCE
Status: COMPLETED | OUTPATIENT
Start: 2025-07-16 | End: 2025-07-16

## 2025-07-16 RX ADMIN — CYCLOBENZAPRINE 10 MG: 10 TABLET, FILM COATED ORAL at 02:34

## 2025-07-16 RX ADMIN — FAMOTIDINE 40 MG: 40 TABLET, FILM COATED ORAL at 02:34

## 2025-07-16 RX ADMIN — CALCIUM CARBONATE 1 TABLET: 500 TABLET, CHEWABLE ORAL at 03:53

## 2025-07-16 SDOH — HEALTH STABILITY: MENTAL HEALTH: HAVE YOU USED ANY SUBSTANCES (CANABIS, COCAINE, HEROIN, HALLUCINOGENS, INHALANTS, ETC.) IN THE PAST 12 MONTHS?: NO

## 2025-07-16 SDOH — HEALTH STABILITY: MENTAL HEALTH: NON-SPECIFIC ACTIVE SUICIDAL THOUGHTS (PAST 1 MONTH): NO

## 2025-07-16 SDOH — HEALTH STABILITY: MENTAL HEALTH: WERE YOU ABLE TO COMPLETE ALL THE BEHAVIORAL HEALTH SCREENINGS?: YES

## 2025-07-16 SDOH — SOCIAL STABILITY: SOCIAL INSECURITY: ARE THERE ANY APPARENT SIGNS OF INJURIES/BEHAVIORS THAT COULD BE RELATED TO ABUSE/NEGLECT?: NO

## 2025-07-16 SDOH — SOCIAL STABILITY: SOCIAL INSECURITY: DOES ANYONE TRY TO KEEP YOU FROM HAVING/CONTACTING OTHER FRIENDS OR DOING THINGS OUTSIDE YOUR HOME?: NO

## 2025-07-16 SDOH — SOCIAL STABILITY: SOCIAL INSECURITY: DO YOU FEEL ANYONE HAS EXPLOITED OR TAKEN ADVANTAGE OF YOU FINANCIALLY OR OF YOUR PERSONAL PROPERTY?: NO

## 2025-07-16 SDOH — SOCIAL STABILITY: SOCIAL INSECURITY: HAVE YOU HAD ANY THOUGHTS OF HARMING ANYONE ELSE?: NO

## 2025-07-16 SDOH — SOCIAL STABILITY: SOCIAL INSECURITY: ARE YOU OR HAVE YOU BEEN THREATENED OR ABUSED PHYSICALLY, EMOTIONALLY, OR SEXUALLY BY ANYONE?: NO

## 2025-07-16 SDOH — HEALTH STABILITY: MENTAL HEALTH: WISH TO BE DEAD (PAST 1 MONTH): NO

## 2025-07-16 SDOH — HEALTH STABILITY: MENTAL HEALTH: SUICIDAL BEHAVIOR (LIFETIME): NO

## 2025-07-16 SDOH — HEALTH STABILITY: MENTAL HEALTH: HAVE YOU USED ANY PRESCRIPTION DRUGS OTHER THAN PRESCRIBED IN THE PAST 12 MONTHS?: NO

## 2025-07-16 SDOH — SOCIAL STABILITY: SOCIAL INSECURITY: HAS ANYONE EVER THREATENED TO HURT YOUR FAMILY OR YOUR PETS?: NO

## 2025-07-16 SDOH — SOCIAL STABILITY: SOCIAL INSECURITY: VERBAL ABUSE: DENIES

## 2025-07-16 SDOH — SOCIAL STABILITY: SOCIAL INSECURITY: ABUSE SCREEN: ADULT

## 2025-07-16 SDOH — SOCIAL STABILITY: SOCIAL INSECURITY: PHYSICAL ABUSE: DENIES

## 2025-07-16 SDOH — ECONOMIC STABILITY: HOUSING INSECURITY: DO YOU FEEL UNSAFE GOING BACK TO THE PLACE WHERE YOU ARE LIVING?: NO

## 2025-07-16 ASSESSMENT — PAIN SCALES - GENERAL
PAINLEVEL_OUTOF10: 8
PAINLEVEL_OUTOF10: 0 - NO PAIN

## 2025-07-16 ASSESSMENT — LIFESTYLE VARIABLES
HOW MANY STANDARD DRINKS CONTAINING ALCOHOL DO YOU HAVE ON A TYPICAL DAY: PATIENT DOES NOT DRINK
HOW OFTEN DO YOU HAVE A DRINK CONTAINING ALCOHOL: NEVER

## 2025-07-16 ASSESSMENT — PATIENT HEALTH QUESTIONNAIRE - PHQ9
2. FEELING DOWN, DEPRESSED OR HOPELESS: NOT AT ALL
1. LITTLE INTEREST OR PLEASURE IN DOING THINGS: NOT AT ALL
SUM OF ALL RESPONSES TO PHQ9 QUESTIONS 1 & 2: 0

## 2025-07-16 NOTE — H&P
Obstetrical Triage History and Physical    Assessment/Plan    Janna Lee is a 34 yo  at 27w0d by LMP c/w 14wk US who presents to triage for chest pain and round ligament pain.    Chest pain  L arm pain  - Patient presented with history of sharp chest pain that radiated down abdomen w/ associated L arm pain, SOB, and dizziness that resolved prior to triage admission  - EKG in ED showed sinus rhythm  - VSS in triage  - Low c/f PE due as patient does not meet Wells' criteria  - Low c/f for cardiac pathology due to neg EKG, troponins and BNP wnl  - Reviewed labor precautions with patient, including regular, painful contractions, vaginal bleeding, leakage of fluid, or decreased fetal movement. Pt expressed understanding.      GERD  - Reports new onset reflux symptoms  - Improvement in sx w/ Tums and Pepcid while in triage  - Discharged patient with rx for Pepcid    Round Ligament Pain  - Reports severe round ligament pain that limits daily activities, reports belly band is not helpful  - Abdominal pain improved after flexeril  - Plan for outpatient PT referral    Maternal comorbidities:   - Anemia, most recent hgb 10.8, ferritin 8    Fetal Wellbeing   - NST reactive    Dispo  - Participates in Centering Pregnancy  - Up to date on prenatal care, patient plans to complete GTT next week    Patient seen and discussed with Dr. Aung Johnston MD  PGY-1    Subjective   Janna Lee is a 35 y.o.  at 27w0d by LMP c/w 14 wk US presenting for left-sided chest pain w/ associated L arm pain as well as round ligament pain.    Patient states she started to develop sharp, L side chest pain around 2pm that was associated with SOB, dizziness, and L arm tingling. Reports one episode of sharp chest pain that radiated down her L abdomen prior to arrival at the ED. Additionally endorses new onset heart burn with regurgitation throughout the day.    On arrival to ED patient received EKG for her chest pain, found  to be sinus rhythm.     While in triage, patient says she no longer has any chest or arm pain, however, continues to endorse difficulty breathing and chest pressure.    Patient also reports month-long history of severe round ligament pain that has limited her daily activities. She received a belly band yesterday and states it has not alleviate her pain.    Denies ctx, LOF, VB, endorses good FM.    Pregnancy notable for:   - Anemia, most recent hgb 10.8, ferritin 8  - Hx of GDM in prior pregnancy  - Hx of prolonged back pain after epidural    Obstetrical History   OB History    Para Term  AB Living   6 4 4  1 4   SAB IAB Ectopic Multiple Live Births   1    4      # Outcome Date GA Lbr Thomas/2nd Weight Sex Type Anes PTL Lv   6 Current            5 SAB 2023 5w0d          4 Term     F Vag-Spont   ROBBIE   3 Term     M Vag-Spont   ROBBIE   2 Term     M Vag-Spont   ROBBIE   1 Term     M Vag-Spont   ROBBIE       Past Medical History  Medical History[1]     Past Surgical History   Surgical History[2]    Social History  Social History     Tobacco Use    Smoking status: Never    Smokeless tobacco: Never   Substance Use Topics    Alcohol use: Not Currently     Substance and Sexual Activity   Drug Use Not Currently       Allergies  Lidocaine     Medications  Prescriptions Prior to Admission[3]    Objective    Last Vitals  Temp Pulse Resp BP MAP O2 Sat   36.5 °C (97.7 °F) 89 16 133/72   100 %     Physical Examination  General: no acute distress  HEENT: normocephalic, atraumatic  Heart: warm and well perfused  Lungs: breathing comfortably on room air  Abdomen: gravid  Extremities: moving all extremities  Neuro: awake and conversant  Psych: appropriate mood and affect    FHT: 145/mod/+accel/-decel  [place]: uterine irritability    Lab Review    Results for orders placed or performed during the hospital encounter of 25 (from the past 24 hours)   ECG 12 lead   Result Value Ref Range    Ventricular Rate 94 BPM     Atrial Rate 94 BPM    NM Interval 158 ms    QRS Duration 80 ms    QT Interval 338 ms    QTC Calculation(Bazett) 422 ms    P Axis 76 degrees    R Axis 78 degrees    T Axis 33 degrees    QRS Count 15 beats    Q Onset 217 ms    P Onset 138 ms    P Offset 192 ms    T Offset 386 ms    QTC Fredericia 392 ms   POCT urinalysis dipstick manually resulted   Result Value Ref Range    Color, UA Yellow     Clarity, UA Clear     Glucose, UA NEGATIVE     Bilirubin, UA NEGATIVE     Ketones, UA Positive     Spec Grav, UA 1.030     Blood, UA Negative     pH, UA 6.5     Protein, UA (1+) Rare     Urobilinogen, UA 0.2     Leukocytes, UA NEGATIVE     Nitrite, UA NEGATIVE     Appearance, Fluid     B-type Natriuretic Peptide   Result Value Ref Range    BNP 10 0 - 99 pg/mL   Troponin I, High Sensitivity   Result Value Ref Range    Troponin I, High Sensitivity (CMC) <3 0 - 34 ng/L              [1]   Past Medical History:  Diagnosis Date    Abnormal Pap smear of cervix     Anemia     Hidradenitis suppurativa 2021    Hydradenitis    Hx of chlamydia infection     Hx of gonorrhea     Low grade squamous intraepithelial lesion on cytologic smear of cervix (LGSIL) 2019    LGSIL on Pap smear of cervix    Migraine     Palpitations 10/21/2023    Personal history of other mental and behavioral disorders 2021    History of bipolar disorder    Pregnancy of unknown anatomic location ([facility]) 2023    Last Assessment & Plan: Formatting of this note is different from the original. Date of referral: 2023 LMP:  GA at time of referral review: Unknown 33 year old  referred to [facility] for PUL. Lab Results Component Value Date  RH Positive 2023  HB 10.0 (L) 2023   (H) 2023  AST 12 (L) 2023  ALT 9 2023  CREAT 0.67 2023 Lab Results Component Value D    Sensation of chest tightness 2023    Comment on above: CHEST PAIN  Added by Problem List Migration; 2013; Moved to  Suppressed Nov 29 2013 9:11PM;  Pt with frequent episides of intermittent sharp CP and L arm tingling/numbnessSuspect non cardiac etiology just based on age and lack of risk factorsAlso 2 negative ED evaluationa and negative stress test and negative EKG;   [2]   Past Surgical History:  Procedure Laterality Date    COLPOSCOPY  04/02/2014    Colposcopy   [3]   Medications Prior to Admission   Medication Sig Dispense Refill Last Dose/Taking    CALCIUM CITRATE-VITAMIN D3 ORAL Take by mouth every 12 hours.       cetirizine (ZyrTEC) 10 mg tablet Take 1 tablet (10 mg) by mouth once daily. (Patient not taking: Reported on 3/10/2025) 90 tablet 2     docusate sodium (Colace) 100 mg capsule Take 1 capsule (100 mg) by mouth 2 times a day as needed for constipation. 60 capsule 5     doxylamine (Unisom, doxylamine,) 25 mg tablet Take 1 tablet (25 mg) by mouth as needed at bedtime for sleep or nausea. 30 tablet 3     ferrous sulfate 325 (65 Fe) mg EC tablet Take 1 tablet by mouth 2 times a day. Every other day.   Do not crush, chew, or split. 60 tablet 11     fluticasone (Flonase) 50 mcg/actuation nasal spray Administer 1 spray into each nostril once daily. Shake gently. Before first use, prime pump. After use, clean tip and replace cap. 16 g 11     iron, carbonyl 15 mg tablet,chewable Chew.       neomycin-polymyxin-HC (Cortisporin) 3.5-10,000-1 mg/mL-unit/mL-% otic suspension Administer 4 drops into each ear 4 times a day. 10 mL 0     prenatal vitamin (Classic Prenatal) 28 mg iron-800 mcg folic acid tablet tablet TAKE 1 TABLET BY MOUTH EVERY DAY 30 tablet 11

## 2025-07-17 ASSESSMENT — ENCOUNTER SYMPTOMS
FEVER: 0
WEIGHT LOSS: 0
HEMATURIA: 0
BELCHING: 1
MYALGIAS: 1
ARTHRALGIAS: 1
FREQUENCY: 1
DYSURIA: 0
CONSTIPATION: 1
HEMATOCHEZIA: 0
ANOREXIA: 0
VOMITING: 1
HEADACHES: 0
ABDOMINAL PAIN: 1

## 2025-07-18 ENCOUNTER — APPOINTMENT (OUTPATIENT)
Dept: OBSTETRICS AND GYNECOLOGY | Facility: CLINIC | Age: 35
End: 2025-07-18
Payer: COMMERCIAL

## 2025-07-18 VITALS — BODY MASS INDEX: 37.2 KG/M2 | SYSTOLIC BLOOD PRESSURE: 116 MMHG | DIASTOLIC BLOOD PRESSURE: 87 MMHG | WEIGHT: 237.5 LBS

## 2025-07-18 DIAGNOSIS — Z34.82 PRENATAL CARE, SUBSEQUENT PREGNANCY, SECOND TRIMESTER (HHS-HCC): Primary | ICD-10-CM

## 2025-07-18 DIAGNOSIS — O26.842: ICD-10-CM

## 2025-07-18 DIAGNOSIS — Z3A.27 27 WEEKS GESTATION OF PREGNANCY (HHS-HCC): ICD-10-CM

## 2025-07-18 PROCEDURE — 99213 OFFICE O/P EST LOW 20 MIN: CPT

## 2025-07-18 PROCEDURE — 99213 OFFICE O/P EST LOW 20 MIN: CPT | Mod: TH

## 2025-07-18 PROCEDURE — 99078 GROUP HEALTH EDUCATION: CPT

## 2025-07-18 PROCEDURE — H1002 CARECOORDINATION PRENATAL: HCPCS

## 2025-07-18 NOTE — PROGRESS NOTES
Subjective   Janna Lee is a 35 y.o.  at 27w2d with a working estimated date of delivery of 10/15/2025, by Last Menstrual Period who presents for Centering #4.    1:1 Visit:  She denies vaginal bleeding, leakage of fluid, or strong/consistent contractions. Endorses good fetal movement.     Objective   Physical Exam  Weight: 108 kg (237 lb 8 oz)  Expected Total Weight Gain: 5 kg (11 lb)-9 kg (19 lb)   Pregravid BMI: 34.45  BP: 116/87  Fetal Heart Rate: 150 Fundal Height (cm): 33 cm    Problem List Items Addressed This Visit    None  Visit Diagnoses         Prenatal care, subsequent pregnancy, second trimester ([facility])    -  Primary    Relevant Orders    CBC Anemia Panel With Reflex,Pregnancy    Syphilis Screen with Reflex    Glucose, 1 Hour Screen, Pregnancy    Type And Screen Is this order related to pregnancy or an upcoming surgery? Yes; Where will this surgery/delivery be performed? [facility]; What is the date of the surgery? 10/15/2025; Has this patient ever had a transfusion? No; Has ...      27 weeks gestation of pregnancy ([facility])        Relevant Orders    CBC Anemia Panel With Reflex,Pregnancy    Syphilis Screen with Reflex    Glucose, 1 Hour Screen, Pregnancy    Type And Screen Is this order related to pregnancy or an upcoming surgery? Yes; Where will this surgery/delivery be performed? [facility]; What is the date of the surgery? 10/15/2025; Has this patient ever had a transfusion? No; Has ...      Uterine size date discrepancy pregnancy, second trimester ([facility])        Relevant Orders     MAC OB imaging order            Centering Session #4 Plan:   Discussed diabetes screening and routine labs, to be completed today or next week.   Patient to try and take medication for heartburn given to her, plan to follow up next week.   -The following educational material was reviewed:  Birth control  Breastfeeding benefits   Family I want to have   -Reviewed reasons to  call CNM on-call: decreased fetal movement, vaginal bleeding, loss of fluid, increased pelvic pain/contractions, or any questions/concerns  -RTC in 4 weeks for next Centering visit or prn  next visit:  Discuss how her breathing is going.   Desires tubal- Plan to sign consent at next visit.     1:1 total time 10min  Centering Visit total time 120min    TRACEY Chaudhary-CAROLM

## 2025-07-22 ENCOUNTER — TELEPHONE (OUTPATIENT)
Dept: BEHAVIORAL HEALTH | Facility: CLINIC | Age: 35
End: 2025-07-22
Payer: COMMERCIAL

## 2025-07-22 NOTE — PROGRESS NOTES
Spoke with Pt in response to  treatment needs. Last documented EPDS was 17 Q10 never. Pt had been referred to  psychology by [facility] and no showed two scheduled initial visits. Assessed for Pt's current  needs. Pt reports that she has been experiencing an inordinate number unpleasant pregnancy symptoms which have rendered her unable to perform her tasks at work. Pt reports that, earlier today, she messaged [facility] Renyoso requesting a letter confirming the debilitating nature of these symptoms to present to [facility] so that they may support her with supplemental funds for the duration of her pregnancy and beyond. Pt reports that the majority of her mood symptoms stem from worry around her family's financial future if she is unable to work for several more months and that relief from this worry would result in an EPDS score of zero. This documentation has been routed to ASHU Reynoso for review.

## 2025-07-23 ENCOUNTER — HOSPITAL ENCOUNTER (OUTPATIENT)
Dept: RADIOLOGY | Facility: CLINIC | Age: 35
Discharge: HOME | End: 2025-07-23
Payer: COMMERCIAL

## 2025-07-23 ENCOUNTER — TELEPHONE (OUTPATIENT)
Dept: OBSTETRICS AND GYNECOLOGY | Facility: CLINIC | Age: 35
End: 2025-07-23
Payer: COMMERCIAL

## 2025-07-23 DIAGNOSIS — O26.842: ICD-10-CM

## 2025-07-23 PROCEDURE — 76816 OB US FOLLOW-UP PER FETUS: CPT

## 2025-07-23 PROCEDURE — 76816 OB US FOLLOW-UP PER FETUS: CPT | Performed by: STUDENT IN AN ORGANIZED HEALTH CARE EDUCATION/TRAINING PROGRAM

## 2025-07-23 NOTE — TELEPHONE ENCOUNTER
Called patient, states she needs forms that say she is high risk and on bed rest.  States Yanet Reynoso has told her that she cannot write that.  Patient has given the fax number to her  and they will be sending forms  Has appointment schedule in two days, RN will watch for forms and they can be reviewed at visit.  Patient verbalizes understanding.

## 2025-07-24 ENCOUNTER — TELEPHONE (OUTPATIENT)
Dept: PEDIATRICS | Facility: CLINIC | Age: 35
End: 2025-07-24
Payer: COMMERCIAL

## 2025-07-25 ENCOUNTER — ROUTINE PRENATAL (OUTPATIENT)
Dept: OBSTETRICS AND GYNECOLOGY | Facility: CLINIC | Age: 35
End: 2025-07-25
Payer: COMMERCIAL

## 2025-07-25 VITALS — WEIGHT: 239 LBS | DIASTOLIC BLOOD PRESSURE: 83 MMHG | BODY MASS INDEX: 37.43 KG/M2 | SYSTOLIC BLOOD PRESSURE: 149 MMHG

## 2025-07-25 DIAGNOSIS — O26.843 UTERINE SIZE DATE DISCREPANCY PREGNANCY, THIRD TRIMESTER (HHS-HCC): ICD-10-CM

## 2025-07-25 DIAGNOSIS — Z3A.27 27 WEEKS GESTATION OF PREGNANCY (HHS-HCC): Primary | ICD-10-CM

## 2025-07-25 PROCEDURE — 99213 OFFICE O/P EST LOW 20 MIN: CPT

## 2025-07-25 PROCEDURE — H1002 CARECOORDINATION PRENATAL: HCPCS

## 2025-07-25 PROCEDURE — S9436 LAMAZE CLASS: HCPCS

## 2025-07-25 PROCEDURE — 99213 OFFICE O/P EST LOW 20 MIN: CPT | Mod: TH

## 2025-07-25 PROCEDURE — 99078 GROUP HEALTH EDUCATION: CPT

## 2025-07-25 NOTE — PROGRESS NOTES
Subjective   Janna Lee is a 35 y.o.  at 28w2d with a working estimated date of delivery of 10/15/2025, by Last Menstrual Period who presents for Centering #5.     1:1 Visit:   She denies vaginal bleeding, leakage of fluid, or strong/consistent contractions. Endorses good fetal movement.   Patient reports continues extreme discomfort from pregnancy including difficulty moving.      Objective   Physical Exam:      Expected Total Weight Gain: 5 kg (11 lb)-9 kg (19 lb)   Pregravid BMI: 34.45     Fetal Heart Rate: 151 Fundal Height (cm): 34 cm             Problem List Items Addressed This Visit          Ob-Gyn Problems    27 weeks gestation of pregnancy ([facility]) - Primary    Overview   Desired provider in labor: [x] CNM  [] Physician   [] Either Acceptable  [x] Blood Products: [x] Yes, accepts [] No, needs counseling  [x] Initial BMI: 34.45   [] Prenatal Labs: ordered  [x] Cervical Cancer Screening up to date  [x] Rh status: O pos  [x] Screen for IPV and Substance Use Risk:  [x] Genetic Screening (cfDNA): ordered 4/15/25  [x] First Trimester Anatomy Screen (11-13.6 wks):  [x] Baby ASA (initiated):  [x] Pregnancy dated by: LMP    [x] Anatomy US: (19-20 wks)  [] Federal Sterilization consent signed (if indicated): desires- plan to obtain consent at next visit.   [] 1hr GCT at 24-28wks:  [] Rhogam (if indicated):   [] Fetal Surveillance (if indicated):  [] Tdap (27-32 wks, may be given up to 36 wks if initial window missed):   [] RSV (32-36 wks) (Sept. to end of ):     [] Feeding Intentions:  [] Postpartum Birth control method:   [] GBS at 36 - 37 wks:  [] 39 weeks discussion of IOL vs. Expectant management:  [] Mode of delivery ( anticipated ):            Other Visit Diagnoses         Uterine size date discrepancy pregnancy, third trimester ([facility])        growth ultrasound 25, 80th%tile.            Centering Sessions #5 Plan:    -The following educational material was reviewed:  Comfort measures and  relaxation options during labor  Stages of labor  Birth video  Pain management options in labor  Movement in labor  -Reviewed s/sx of PTL, warning signs, fetal movement counts, and when to call provider  -Return to clinic in 2 weeks for next Centering visit or prn   next visit:     1:1 total time 10min  Centering Visit total time 120min    TRACEY Chaudhary-CAROLM

## 2025-08-06 ENCOUNTER — LAB (OUTPATIENT)
Dept: LAB | Facility: HOSPITAL | Age: 35
End: 2025-08-06
Payer: COMMERCIAL

## 2025-08-06 LAB
ABO GROUP (TYPE) IN BLOOD: NORMAL
ANTIBODY SCREEN: NORMAL
ERYTHROCYTE [DISTWIDTH] IN BLOOD BY AUTOMATED COUNT: 17.6 % (ref 11.5–14.5)
FERRITIN SERPL-MCNC: 18 NG/ML
FOLATE SERPL-MCNC: 17.8 NG/ML
HCT VFR BLD AUTO: 36.1 % (ref 36–46)
HGB BLD-MCNC: 10.7 G/DL (ref 12–16)
IRON SATN MFR SERPL: NORMAL %
IRON SERPL-MCNC: 60 UG/DL
MCH RBC QN AUTO: 26.1 PG (ref 26–34)
MCHC RBC AUTO-ENTMCNC: 29.6 G/DL (ref 32–36)
MCV RBC AUTO: 88 FL (ref 80–100)
NRBC BLD-RTO: 0.2 /100 WBCS (ref 0–0)
PLATELET # BLD AUTO: 315 X10*3/UL (ref 150–450)
RBC # BLD AUTO: 4.1 X10*6/UL (ref 4–5.2)
REFLEX ADDED, ANEMIA PANEL: NORMAL
RH FACTOR (ANTIGEN D): NORMAL
TIBC SERPL-MCNC: NORMAL UG/DL
UIBC SERPL-MCNC: >450 UG/DL
VIT B12 SERPL-MCNC: 598 PG/ML
WBC # BLD AUTO: 10.8 X10*3/UL (ref 4.4–11.3)

## 2025-08-06 PROCEDURE — 86900 BLOOD TYPING SEROLOGIC ABO: CPT

## 2025-08-06 PROCEDURE — 83550 IRON BINDING TEST: CPT

## 2025-08-06 PROCEDURE — 82728 ASSAY OF FERRITIN: CPT

## 2025-08-06 PROCEDURE — 86850 RBC ANTIBODY SCREEN: CPT

## 2025-08-06 PROCEDURE — 82607 VITAMIN B-12: CPT

## 2025-08-06 PROCEDURE — 82746 ASSAY OF FOLIC ACID SERUM: CPT

## 2025-08-06 PROCEDURE — 86901 BLOOD TYPING SEROLOGIC RH(D): CPT

## 2025-08-06 PROCEDURE — 85027 COMPLETE CBC AUTOMATED: CPT

## 2025-08-08 ENCOUNTER — ROUTINE PRENATAL (OUTPATIENT)
Dept: OBSTETRICS AND GYNECOLOGY | Facility: CLINIC | Age: 35
End: 2025-08-08
Payer: COMMERCIAL

## 2025-08-08 VITALS — WEIGHT: 245 LBS | DIASTOLIC BLOOD PRESSURE: 85 MMHG | SYSTOLIC BLOOD PRESSURE: 137 MMHG | BODY MASS INDEX: 38.37 KG/M2

## 2025-08-08 DIAGNOSIS — N39.41 URGE INCONTINENCE: ICD-10-CM

## 2025-08-08 DIAGNOSIS — Z34.83 PRENATAL CARE, SUBSEQUENT PREGNANCY, THIRD TRIMESTER (HHS-HCC): Primary | ICD-10-CM

## 2025-08-08 DIAGNOSIS — O26.843 UTERINE SIZE DATE DISCREPANCY PREGNANCY, THIRD TRIMESTER (HHS-HCC): ICD-10-CM

## 2025-08-08 DIAGNOSIS — Z3A.30 30 WEEKS GESTATION OF PREGNANCY (HHS-HCC): ICD-10-CM

## 2025-08-08 PROCEDURE — 99078 GROUP HEALTH EDUCATION: CPT

## 2025-08-08 PROCEDURE — H1002 CARECOORDINATION PRENATAL: HCPCS

## 2025-08-08 PROCEDURE — S9436 LAMAZE CLASS: HCPCS

## 2025-08-08 PROCEDURE — 99213 OFFICE O/P EST LOW 20 MIN: CPT

## 2025-08-10 LAB
GLUCOSE 1H P 50 G GLC PO SERPL-MCNC: 97 MG/DL
T PALLIDUM AB SER QL IA: NEGATIVE

## 2025-08-15 PROBLEM — Z3A.31 31 WEEKS GESTATION OF PREGNANCY (HHS-HCC): Status: ACTIVE | Noted: 2025-03-13

## 2025-08-15 PROBLEM — Z3A.30 30 WEEKS GESTATION OF PREGNANCY (HHS-HCC): Status: ACTIVE | Noted: 2025-03-13

## 2025-08-25 ENCOUNTER — TELEPHONE (OUTPATIENT)
Dept: OBSTETRICS AND GYNECOLOGY | Facility: CLINIC | Age: 35
End: 2025-08-25
Payer: COMMERCIAL

## 2025-08-25 ENCOUNTER — HOSPITAL ENCOUNTER (OUTPATIENT)
Facility: HOSPITAL | Age: 35
Discharge: HOME | End: 2025-08-25
Attending: OBSTETRICS & GYNECOLOGY | Admitting: OBSTETRICS & GYNECOLOGY
Payer: COMMERCIAL

## 2025-08-25 PROBLEM — R03.0 ELEVATED BLOOD PRESSURE READING WITHOUT DIAGNOSIS OF HYPERTENSION: Status: ACTIVE | Noted: 2025-08-25

## 2025-08-25 LAB
ALBUMIN SERPL BCP-MCNC: 3.5 G/DL (ref 3.4–5)
ALP SERPL-CCNC: 93 U/L (ref 33–110)
ALT SERPL W P-5'-P-CCNC: 11 U/L (ref 7–45)
ANION GAP SERPL CALC-SCNC: 11 MMOL/L (ref 10–20)
AST SERPL W P-5'-P-CCNC: 13 U/L (ref 9–39)
BILIRUB SERPL-MCNC: 0.2 MG/DL (ref 0–1.2)
BILIRUBIN, POC: NEGATIVE
BLOOD URINE, POC: NEGATIVE
BUN SERPL-MCNC: 8 MG/DL (ref 6–23)
CALCIUM SERPL-MCNC: 9.4 MG/DL (ref 8.6–10.6)
CHLORIDE SERPL-SCNC: 104 MMOL/L (ref 98–107)
CLARITY, POC: CLEAR
CO2 SERPL-SCNC: 25 MMOL/L (ref 21–32)
COLOR, POC: YELLOW
CREAT SERPL-MCNC: 0.44 MG/DL (ref 0.5–1.05)
CREAT UR-MCNC: 177.1 MG/DL (ref 20–320)
EGFRCR SERPLBLD CKD-EPI 2021: >90 ML/MIN/1.73M*2
ERYTHROCYTE [DISTWIDTH] IN BLOOD BY AUTOMATED COUNT: 16.6 % (ref 11.5–14.5)
GLUCOSE SERPL-MCNC: 68 MG/DL (ref 74–99)
GLUCOSE URINE, POC: NEGATIVE
HCT VFR BLD AUTO: 34.1 % (ref 36–46)
HGB BLD-MCNC: 10.5 G/DL (ref 12–16)
KETONES, POC: NEGATIVE
LDH SERPL L TO P-CCNC: 152 U/L (ref 84–246)
LEUKOCYTE EST, POC: NEGATIVE
MCH RBC QN AUTO: 26.5 PG (ref 26–34)
MCHC RBC AUTO-ENTMCNC: 30.8 G/DL (ref 32–36)
MCV RBC AUTO: 86 FL (ref 80–100)
NITRITE, POC: NEGATIVE
NRBC BLD-RTO: 0 /100 WBCS (ref 0–0)
PH, POC: 6.5
PLATELET # BLD AUTO: 284 X10*3/UL (ref 150–450)
POC APPEARANCE OF BODY FLUID: NORMAL
POTASSIUM SERPL-SCNC: 4.1 MMOL/L (ref 3.5–5.3)
PROT SERPL-MCNC: 6.8 G/DL (ref 6.4–8.2)
PROT UR-ACNC: 36 MG/DL (ref 5–24)
PROT/CREAT UR: 0.2 MG/MG CREAT (ref 0–0.17)
RBC # BLD AUTO: 3.96 X10*6/UL (ref 4–5.2)
SODIUM SERPL-SCNC: 136 MMOL/L (ref 136–145)
SPECIFIC GRAVITY, POC: 1.02
URATE SERPL-MCNC: 4.4 MG/DL (ref 2.3–6.7)
URINE PROTEIN, POC: NORMAL
UROBILINOGEN, POC: 1
WBC # BLD AUTO: 9.3 X10*3/UL (ref 4.4–11.3)

## 2025-08-25 PROCEDURE — 99214 OFFICE O/P EST MOD 30 MIN: CPT | Mod: 25

## 2025-08-25 PROCEDURE — 59025 FETAL NON-STRESS TEST: CPT | Performed by: ADVANCED PRACTICE MIDWIFE

## 2025-08-25 PROCEDURE — 99214 OFFICE O/P EST MOD 30 MIN: CPT | Performed by: ADVANCED PRACTICE MIDWIFE

## 2025-08-25 PROCEDURE — 2500000001 HC RX 250 WO HCPCS SELF ADMINISTERED DRUGS (ALT 637 FOR MEDICARE OP): Mod: SE | Performed by: ADVANCED PRACTICE MIDWIFE

## 2025-08-25 PROCEDURE — 80053 COMPREHEN METABOLIC PANEL: CPT | Performed by: ADVANCED PRACTICE MIDWIFE

## 2025-08-25 PROCEDURE — 84550 ASSAY OF BLOOD/URIC ACID: CPT | Performed by: ADVANCED PRACTICE MIDWIFE

## 2025-08-25 PROCEDURE — 82570 ASSAY OF URINE CREATININE: CPT | Performed by: ADVANCED PRACTICE MIDWIFE

## 2025-08-25 PROCEDURE — 36415 COLL VENOUS BLD VENIPUNCTURE: CPT

## 2025-08-25 PROCEDURE — 85027 COMPLETE CBC AUTOMATED: CPT | Performed by: ADVANCED PRACTICE MIDWIFE

## 2025-08-25 PROCEDURE — 83615 LACTATE (LD) (LDH) ENZYME: CPT | Performed by: ADVANCED PRACTICE MIDWIFE

## 2025-08-25 PROCEDURE — 36415 COLL VENOUS BLD VENIPUNCTURE: CPT | Performed by: ADVANCED PRACTICE MIDWIFE

## 2025-08-25 RX ORDER — ACETAMINOPHEN 325 MG/1
975 TABLET ORAL ONCE
Status: COMPLETED | OUTPATIENT
Start: 2025-08-25 | End: 2025-08-25

## 2025-08-25 RX ORDER — LABETALOL HYDROCHLORIDE 5 MG/ML
20 INJECTION, SOLUTION INTRAVENOUS ONCE AS NEEDED
Status: DISCONTINUED | OUTPATIENT
Start: 2025-08-25 | End: 2025-08-26 | Stop reason: HOSPADM

## 2025-08-25 RX ORDER — ONDANSETRON HYDROCHLORIDE 2 MG/ML
4 INJECTION, SOLUTION INTRAVENOUS EVERY 6 HOURS PRN
Status: DISCONTINUED | OUTPATIENT
Start: 2025-08-25 | End: 2025-08-26 | Stop reason: HOSPADM

## 2025-08-25 RX ORDER — HYDRALAZINE HYDROCHLORIDE 20 MG/ML
5 INJECTION INTRAMUSCULAR; INTRAVENOUS ONCE AS NEEDED
Status: DISCONTINUED | OUTPATIENT
Start: 2025-08-25 | End: 2025-08-26 | Stop reason: HOSPADM

## 2025-08-25 RX ORDER — ONDANSETRON 4 MG/1
4 TABLET, FILM COATED ORAL EVERY 6 HOURS PRN
Status: DISCONTINUED | OUTPATIENT
Start: 2025-08-25 | End: 2025-08-26 | Stop reason: HOSPADM

## 2025-08-25 RX ADMIN — ACETAMINOPHEN 975 MG: 325 TABLET ORAL at 22:52

## 2025-08-25 SDOH — SOCIAL STABILITY: SOCIAL INSECURITY: DOES ANYONE TRY TO KEEP YOU FROM HAVING/CONTACTING OTHER FRIENDS OR DOING THINGS OUTSIDE YOUR HOME?: NO

## 2025-08-25 SDOH — SOCIAL STABILITY: SOCIAL INSECURITY: HAVE YOU HAD THOUGHTS OF HARMING ANYONE ELSE?: NO

## 2025-08-25 SDOH — HEALTH STABILITY: MENTAL HEALTH: SUICIDAL BEHAVIOR (LIFETIME): NO

## 2025-08-25 SDOH — HEALTH STABILITY: MENTAL HEALTH: NON-SPECIFIC ACTIVE SUICIDAL THOUGHTS (PAST 1 MONTH): NO

## 2025-08-25 SDOH — HEALTH STABILITY: MENTAL HEALTH: WISH TO BE DEAD (PAST 1 MONTH): NO

## 2025-08-25 SDOH — SOCIAL STABILITY: SOCIAL INSECURITY: VERBAL ABUSE: DENIES

## 2025-08-25 SDOH — SOCIAL STABILITY: SOCIAL INSECURITY: ARE THERE ANY APPARENT SIGNS OF INJURIES/BEHAVIORS THAT COULD BE RELATED TO ABUSE/NEGLECT?: NO

## 2025-08-25 SDOH — SOCIAL STABILITY: SOCIAL INSECURITY: ARE YOU OR HAVE YOU BEEN THREATENED OR ABUSED PHYSICALLY, EMOTIONALLY, OR SEXUALLY BY ANYONE?: NO

## 2025-08-25 SDOH — SOCIAL STABILITY: SOCIAL INSECURITY: PHYSICAL ABUSE: DENIES

## 2025-08-25 SDOH — SOCIAL STABILITY: SOCIAL INSECURITY: HAVE YOU HAD ANY THOUGHTS OF HARMING ANYONE ELSE?: NO

## 2025-08-25 SDOH — HEALTH STABILITY: MENTAL HEALTH: HAVE YOU USED ANY SUBSTANCES (CANABIS, COCAINE, HEROIN, HALLUCINOGENS, INHALANTS, ETC.) IN THE PAST 12 MONTHS?: NO

## 2025-08-25 SDOH — HEALTH STABILITY: MENTAL HEALTH: HAVE YOU USED ANY PRESCRIPTION DRUGS OTHER THAN PRESCRIBED IN THE PAST 12 MONTHS?: NO

## 2025-08-25 SDOH — HEALTH STABILITY: MENTAL HEALTH: WERE YOU ABLE TO COMPLETE ALL THE BEHAVIORAL HEALTH SCREENINGS?: YES

## 2025-08-25 SDOH — SOCIAL STABILITY: SOCIAL INSECURITY: DO YOU FEEL ANYONE HAS EXPLOITED OR TAKEN ADVANTAGE OF YOU FINANCIALLY OR OF YOUR PERSONAL PROPERTY?: NO

## 2025-08-25 SDOH — SOCIAL STABILITY: SOCIAL INSECURITY: ABUSE SCREEN: ADULT

## 2025-08-25 SDOH — SOCIAL STABILITY: SOCIAL INSECURITY: HAS ANYONE EVER THREATENED TO HURT YOUR FAMILY OR YOUR PETS?: NO

## 2025-08-25 SDOH — ECONOMIC STABILITY: HOUSING INSECURITY: DO YOU FEEL UNSAFE GOING BACK TO THE PLACE WHERE YOU ARE LIVING?: NO

## 2025-08-25 ASSESSMENT — PAIN SCALES - GENERAL
PAINLEVEL_OUTOF10: 4
PAINLEVEL_OUTOF10: 6

## 2025-08-25 ASSESSMENT — PAIN - FUNCTIONAL ASSESSMENT: PAIN_FUNCTIONAL_ASSESSMENT: 0-10

## 2025-08-25 ASSESSMENT — LIFESTYLE VARIABLES
HOW OFTEN DO YOU HAVE A DRINK CONTAINING ALCOHOL: NEVER
HOW OFTEN DO YOU HAVE 6 OR MORE DRINKS ON ONE OCCASION: NEVER

## 2025-08-25 ASSESSMENT — PATIENT HEALTH QUESTIONNAIRE - PHQ9: 1. LITTLE INTEREST OR PLEASURE IN DOING THINGS: NOT AT ALL

## 2025-08-25 ASSESSMENT — PAIN DESCRIPTION - LOCATION: LOCATION: SHOULDER

## 2025-08-26 VITALS
SYSTOLIC BLOOD PRESSURE: 136 MMHG | WEIGHT: 251.99 LBS | HEART RATE: 81 BPM | TEMPERATURE: 98.1 F | RESPIRATION RATE: 16 BRPM | DIASTOLIC BLOOD PRESSURE: 79 MMHG | BODY MASS INDEX: 39.55 KG/M2 | OXYGEN SATURATION: 99 % | HEIGHT: 67 IN

## 2025-09-03 DIAGNOSIS — R52 PAIN: Primary | ICD-10-CM
